# Patient Record
Sex: FEMALE | Race: WHITE | NOT HISPANIC OR LATINO | URBAN - METROPOLITAN AREA
[De-identification: names, ages, dates, MRNs, and addresses within clinical notes are randomized per-mention and may not be internally consistent; named-entity substitution may affect disease eponyms.]

---

## 2017-07-21 ENCOUNTER — INPATIENT (INPATIENT)
Facility: HOSPITAL | Age: 82
LOS: 3 days | Discharge: HOME | DRG: 640 | End: 2017-07-25
Attending: FAMILY MEDICINE | Admitting: FAMILY MEDICINE
Payer: MEDICARE

## 2017-07-21 VITALS
HEART RATE: 48 BPM | SYSTOLIC BLOOD PRESSURE: 118 MMHG | OXYGEN SATURATION: 97 % | TEMPERATURE: 97 F | DIASTOLIC BLOOD PRESSURE: 60 MMHG | WEIGHT: 119.93 LBS | RESPIRATION RATE: 12 BRPM

## 2017-07-21 LAB
ALBUMIN SERPL ELPH-MCNC: 3.4 G/DL — SIGNIFICANT CHANGE UP (ref 3.3–5)
ALP SERPL-CCNC: 135 U/L — HIGH (ref 40–120)
ALT FLD-CCNC: 22 U/L — SIGNIFICANT CHANGE UP (ref 12–78)
ANION GAP SERPL CALC-SCNC: 7 MMOL/L — SIGNIFICANT CHANGE UP (ref 5–17)
APPEARANCE UR: CLEAR — SIGNIFICANT CHANGE UP
AST SERPL-CCNC: 28 U/L — SIGNIFICANT CHANGE UP (ref 15–37)
BILIRUB SERPL-MCNC: 0.6 MG/DL — SIGNIFICANT CHANGE UP (ref 0.2–1.2)
BILIRUB UR-MCNC: NEGATIVE — SIGNIFICANT CHANGE UP
BUN SERPL-MCNC: 33 MG/DL — HIGH (ref 7–23)
CALCIUM SERPL-MCNC: 8.9 MG/DL — SIGNIFICANT CHANGE UP (ref 8.5–10.1)
CHLORIDE SERPL-SCNC: 105 MMOL/L — SIGNIFICANT CHANGE UP (ref 96–108)
CO2 SERPL-SCNC: 31 MMOL/L — SIGNIFICANT CHANGE UP (ref 22–31)
COLOR SPEC: YELLOW — SIGNIFICANT CHANGE UP
CREAT SERPL-MCNC: 1.1 MG/DL — SIGNIFICANT CHANGE UP (ref 0.5–1.3)
DIFF PNL FLD: NEGATIVE — SIGNIFICANT CHANGE UP
GLUCOSE SERPL-MCNC: 130 MG/DL — HIGH (ref 70–99)
GLUCOSE UR QL: NEGATIVE — SIGNIFICANT CHANGE UP
HCT VFR BLD CALC: 41.4 % — SIGNIFICANT CHANGE UP (ref 34.5–45)
HGB BLD-MCNC: 13.4 G/DL — SIGNIFICANT CHANGE UP (ref 11.5–15.5)
INR BLD: 1.11 RATIO — SIGNIFICANT CHANGE UP (ref 0.88–1.16)
KETONES UR-MCNC: NEGATIVE — SIGNIFICANT CHANGE UP
LACTATE SERPL-SCNC: 1.3 MMOL/L — SIGNIFICANT CHANGE UP (ref 0.7–2)
LEUKOCYTE ESTERASE UR-ACNC: NEGATIVE — SIGNIFICANT CHANGE UP
MCHC RBC-ENTMCNC: 27.6 PG — SIGNIFICANT CHANGE UP (ref 27–34)
MCHC RBC-ENTMCNC: 32.3 GM/DL — SIGNIFICANT CHANGE UP (ref 32–36)
MCV RBC AUTO: 85.3 FL — SIGNIFICANT CHANGE UP (ref 80–100)
NITRITE UR-MCNC: NEGATIVE — SIGNIFICANT CHANGE UP
PH UR: 5 — SIGNIFICANT CHANGE UP (ref 5–8)
PLATELET # BLD AUTO: 134 K/UL — LOW (ref 150–400)
POTASSIUM SERPL-MCNC: 3.6 MMOL/L — SIGNIFICANT CHANGE UP (ref 3.5–5.3)
POTASSIUM SERPL-SCNC: 3.6 MMOL/L — SIGNIFICANT CHANGE UP (ref 3.5–5.3)
PROT SERPL-MCNC: 6.6 G/DL — SIGNIFICANT CHANGE UP (ref 6–8.3)
PROT UR-MCNC: NEGATIVE — SIGNIFICANT CHANGE UP
PROTHROM AB SERPL-ACNC: 12.1 SEC — SIGNIFICANT CHANGE UP (ref 9.8–12.7)
RBC # BLD: 4.85 M/UL — SIGNIFICANT CHANGE UP (ref 3.8–5.2)
RBC # FLD: 13.4 % — SIGNIFICANT CHANGE UP (ref 10.3–14.5)
SODIUM SERPL-SCNC: 143 MMOL/L — SIGNIFICANT CHANGE UP (ref 135–145)
SP GR SPEC: 1.02 — SIGNIFICANT CHANGE UP (ref 1.01–1.02)
UROBILINOGEN FLD QL: NEGATIVE — SIGNIFICANT CHANGE UP
WBC # BLD: 9.6 K/UL — SIGNIFICANT CHANGE UP (ref 3.8–10.5)
WBC # FLD AUTO: 9.6 K/UL — SIGNIFICANT CHANGE UP (ref 3.8–10.5)

## 2017-07-21 PROCEDURE — 70450 CT HEAD/BRAIN W/O DYE: CPT | Mod: 26

## 2017-07-21 PROCEDURE — 99285 EMERGENCY DEPT VISIT HI MDM: CPT

## 2017-07-21 PROCEDURE — 71010: CPT | Mod: 26

## 2017-07-21 PROCEDURE — 93010 ELECTROCARDIOGRAM REPORT: CPT

## 2017-07-21 RX ORDER — SODIUM CHLORIDE 9 MG/ML
1000 INJECTION INTRAMUSCULAR; INTRAVENOUS; SUBCUTANEOUS
Qty: 0 | Refills: 0 | Status: COMPLETED | OUTPATIENT
Start: 2017-07-21 | End: 2017-07-21

## 2017-07-21 RX ORDER — PIPERACILLIN AND TAZOBACTAM 4; .5 G/20ML; G/20ML
3.38 INJECTION, POWDER, LYOPHILIZED, FOR SOLUTION INTRAVENOUS ONCE
Qty: 0 | Refills: 0 | Status: COMPLETED | OUTPATIENT
Start: 2017-07-21 | End: 2017-07-22

## 2017-07-21 RX ADMIN — SODIUM CHLORIDE 1000 MILLILITER(S): 9 INJECTION INTRAMUSCULAR; INTRAVENOUS; SUBCUTANEOUS at 23:02

## 2017-07-21 NOTE — ED PROVIDER NOTE - CARE PLAN
Principal Discharge DX:	Altered mental state  Secondary Diagnosis:	Hypothermia  Secondary Diagnosis:	Dehydration

## 2017-07-21 NOTE — ED ADULT NURSE NOTE - ED STAT RN HANDOFF DETAILS
83 y/o f admitted to telemetry for further management and care of ams/dehydration/hypothermia. presently pt lethargic, protecting airway, tmax 95.7 ( rectally- hypothermia blanket in place as ordered), hr 56, bp 110/61, saturating well on ra. endorsed to sofia leal for continuum of care. family at bedside.

## 2017-07-21 NOTE — ED ADULT NURSE NOTE - NS ED PATIENT SAFETY CONCERN
[birth year]        [address]  [address] [place] [address]      Dear Nishant Pearson,    [address] [facility] records indicate that you have outstanding lab work and or testing that was ordered for you and has not yet been completed:          CBC With Differential W No

## 2017-07-21 NOTE — ED ADULT NURSE NOTE - OBJECTIVE STATEMENT
@2240 Received and assumed care of pt , lethargic however protecting airway, tmax 97.5 rectally, hr 55, bp 114/72, saturating well on ra, fingerstick 139mg/dl.. pt is a 83 y/o f with hx dementia presents to ed accompanied by daughter, per daughter pt was sitting at the table and slumped over, no head trauma. seen and evaluated by dr. rodarte. iv established with labs/cultures collected and sent, ivf's infusing, ekg completed, pending ct scan. cardiac and pulse oximetry monitoring in place, will continue to monitor closely @2240 Received and assumed care of pt , lethargic however protecting airway, tmax 95.7 rectally ( per md rodarte will place on behr hugger), hr 55, bp 114/72, saturating well on ra, fingerstick 139mg/dl.. pt is a 83 y/o f with hx dementia presents to ed accompanied by daughter, per daughter pt was sitting at the table and slumped over, no head trauma. seen and evaluated by dr. rodarte. iv established with labs/cultures collected and sent, ivf's infusing, ekg completed, pending ct scan. cardiac and pulse oximetry monitoring in place, will continue to monitor closely

## 2017-07-22 DIAGNOSIS — T68.XXXA HYPOTHERMIA, INITIAL ENCOUNTER: ICD-10-CM

## 2017-07-22 DIAGNOSIS — Z29.9 ENCOUNTER FOR PROPHYLACTIC MEASURES, UNSPECIFIED: ICD-10-CM

## 2017-07-22 DIAGNOSIS — E86.0 DEHYDRATION: ICD-10-CM

## 2017-07-22 DIAGNOSIS — R41.89 OTHER SYMPTOMS AND SIGNS INVOLVING COGNITIVE FUNCTIONS AND AWARENESS: ICD-10-CM

## 2017-07-22 DIAGNOSIS — F03.90 UNSPECIFIED DEMENTIA, UNSPECIFIED SEVERITY, WITHOUT BEHAVIORAL DISTURBANCE, PSYCHOTIC DISTURBANCE, MOOD DISTURBANCE, AND ANXIETY: ICD-10-CM

## 2017-07-22 DIAGNOSIS — R41.82 ALTERED MENTAL STATUS, UNSPECIFIED: ICD-10-CM

## 2017-07-22 DIAGNOSIS — A41.9 SEPSIS, UNSPECIFIED ORGANISM: ICD-10-CM

## 2017-07-22 LAB
BLD GP AB SCN SERPL QL: SIGNIFICANT CHANGE UP
CK SERPL-CCNC: 71 U/L — SIGNIFICANT CHANGE UP (ref 26–192)
FOLATE SERPL-MCNC: 13.7 NG/ML — SIGNIFICANT CHANGE UP (ref 4.8–24.2)
HBA1C BLD-MCNC: 5 % — SIGNIFICANT CHANGE UP (ref 4–5.6)
T3 SERPL-MCNC: 85 NG/DL — SIGNIFICANT CHANGE UP (ref 80–200)
T4 AB SER-ACNC: 7.7 UG/DL — SIGNIFICANT CHANGE UP (ref 4.6–12)
TROPONIN I SERPL-MCNC: <.015 NG/ML — SIGNIFICANT CHANGE UP (ref 0.01–0.04)
VIT B12 SERPL-MCNC: 569 PG/ML — SIGNIFICANT CHANGE UP (ref 243–894)

## 2017-07-22 RX ORDER — HEPARIN SODIUM 5000 [USP'U]/ML
5000 INJECTION INTRAVENOUS; SUBCUTANEOUS EVERY 12 HOURS
Qty: 0 | Refills: 0 | Status: DISCONTINUED | OUTPATIENT
Start: 2017-07-21 | End: 2017-07-22

## 2017-07-22 RX ORDER — SODIUM CHLORIDE 9 MG/ML
1000 INJECTION INTRAMUSCULAR; INTRAVENOUS; SUBCUTANEOUS
Qty: 0 | Refills: 0 | Status: DISCONTINUED | OUTPATIENT
Start: 2017-07-22 | End: 2017-07-24

## 2017-07-22 RX ORDER — HEPARIN SODIUM 5000 [USP'U]/ML
5000 INJECTION INTRAVENOUS; SUBCUTANEOUS EVERY 8 HOURS
Qty: 0 | Refills: 0 | Status: DISCONTINUED | OUTPATIENT
Start: 2017-07-22 | End: 2017-07-24

## 2017-07-22 RX ADMIN — SODIUM CHLORIDE 75 MILLILITER(S): 9 INJECTION INTRAMUSCULAR; INTRAVENOUS; SUBCUTANEOUS at 18:50

## 2017-07-22 RX ADMIN — HEPARIN SODIUM 5000 UNIT(S): 5000 INJECTION INTRAVENOUS; SUBCUTANEOUS at 16:15

## 2017-07-22 RX ADMIN — PIPERACILLIN AND TAZOBACTAM 200 GRAM(S): 4; .5 INJECTION, POWDER, LYOPHILIZED, FOR SOLUTION INTRAVENOUS at 00:12

## 2017-07-22 RX ADMIN — HEPARIN SODIUM 5000 UNIT(S): 5000 INJECTION INTRAVENOUS; SUBCUTANEOUS at 05:09

## 2017-07-22 RX ADMIN — HEPARIN SODIUM 5000 UNIT(S): 5000 INJECTION INTRAVENOUS; SUBCUTANEOUS at 22:42

## 2017-07-22 NOTE — DIETITIAN INITIAL EVALUATION ADULT. - OTHER INFO
Per MAGAN Fernandes, pt eating poorly despite encouragement, head CT- negative, admitted due to unresponsiveness and facial droop. PMHX as below. Pt on bed laying in fetal position, appears thin. MVI is appropriate in view of pressure injury. Pt might benefit from a downgrade in diet consistency to mech soft or puree and Ensure Enlive supplementation

## 2017-07-22 NOTE — H&P ADULT - HISTORY OF PRESENT ILLNESS
This is an 82y Female complaining of unresponsive.  81 y/o w/f h/o advanced dementia, she developed acute mental change at 9am. She was assisted by her grandson and walked to the bathroom and afterwards she had a meal. At 9pm she was found unresponsive and facial droop. In the ER she is not verbal, moves all extremities and resisting the staff with good upper extremity strength. This is an 81 YO W Female brought to ER because patient found unresponsive by her grand son.  According to the Grand son patient with advanced dementia, She developed acute confusion around 9 am. She was assisted by her grandson and walked to the bathroom and afterwards she had a meal. At around 9 pm she was found unresponsive and facial droop. In the ER she is not verbal, moves all extremities and resisting the staff with good upper extremity strength. No nausea or vomiting.

## 2017-07-22 NOTE — H&P ADULT - NSHPLABSRESULTS_GEN_ALL_CORE
13.4   9.6   )-----------( 134      ( 2017 22:54 )             41.4     2017 22:54    143    |  105    |  33     ----------------------------<  130    3.6     |  31     |  1.10     Ca    8.9        2017 22:54    TPro  6.6    /  Alb  3.4    /  TBili  0.6    /  DBili  x      /  AST  28     /  ALT  22     /  AlkPhos  135    2017 22:54    LIVER FUNCTIONS - ( 2017 22:54 )  Alb: 3.4 g/dL / Pro: 6.6 g/dL / ALK PHOS: 135 U/L / ALT: 22 U/L / AST: 28 U/L / GGT: x           PT/INR - ( 2017 22:54 )   PT: 12.1 sec;   INR: 1.11 ratio           CAPILLARY BLOOD GLUCOSE  139 (2017 22:15)        CARDIAC MARKERS ( 2017 00:19 )  <.015 ng/mL / x     / 71 U/L / x     / x          Urinalysis Basic - ( 2017 22:57 )    Color: Yellow / Appearance: Clear / S.020 / pH: x  Gluc: x / Ketone: Negative  / Bili: Negative / Urobili: Negative   Blood: x / Protein: Negative / Nitrite: Negative   Leuk Esterase: Negative / RBC: x / WBC x   Sq Epi: x / Non Sq Epi: x / Bacteria: x

## 2017-07-22 NOTE — ED ADULT NURSE REASSESSMENT NOTE - NS ED NURSE REASSESS COMMENT FT1
patient admitted to telemetry services for further management and care of AMS, hypothermia & dehydration. awaiting bed assignment, will continue to monitor. pt in no apparent distress.

## 2017-07-22 NOTE — PATIENT PROFILE ADULT. - VISION (WITH CORRECTIVE LENSES IF THE PATIENT USUALLY WEARS THEM):
nonresponsive/Normal vision: sees adequately in most situations; can see medication labels, newsprint

## 2017-07-23 DIAGNOSIS — G30.8 OTHER ALZHEIMER'S DISEASE: ICD-10-CM

## 2017-07-23 LAB
CULTURE RESULTS: NO GROWTH — SIGNIFICANT CHANGE UP
SPECIMEN SOURCE: SIGNIFICANT CHANGE UP

## 2017-07-23 RX ADMIN — SODIUM CHLORIDE 75 MILLILITER(S): 9 INJECTION INTRAMUSCULAR; INTRAVENOUS; SUBCUTANEOUS at 05:30

## 2017-07-23 RX ADMIN — Medication 1 TABLET(S): at 11:19

## 2017-07-23 RX ADMIN — HEPARIN SODIUM 5000 UNIT(S): 5000 INJECTION INTRAVENOUS; SUBCUTANEOUS at 21:57

## 2017-07-23 RX ADMIN — HEPARIN SODIUM 5000 UNIT(S): 5000 INJECTION INTRAVENOUS; SUBCUTANEOUS at 13:08

## 2017-07-23 RX ADMIN — HEPARIN SODIUM 5000 UNIT(S): 5000 INJECTION INTRAVENOUS; SUBCUTANEOUS at 05:30

## 2017-07-23 NOTE — CONSULT NOTE ADULT - SUBJECTIVE AND OBJECTIVE BOX
HPI:  This is an 83 YO W Female brought to ER because patient found unresponsive by her grand son.  According to the Grand son patient with advanced dementia, She developed acute confusion around 9 am. She was assisted by her grandson and walked to the bathroom and afterwards she had a meal. At around 9 pm she was found unresponsive and facial droop. In the ER she is not verbal, moves all extremities and resisting the staff with good upper extremity strength. No nausea or vomiting. (2017 05:14)    Patient not able to add to history    PAST MEDICAL & SURGICAL HISTORY:  Dementia      Antimicrobials  NONE after 1-dose Zosyn in ER    Immunological      Other  heparin  Injectable 5000 Unit(s) SubCutaneous every 8 hours  multivitamin 1 Tablet(s) Oral daily  sodium chloride 0.9%. 1000 milliLiter(s) IV Continuous <Continuous>      Allergies    No Known Allergies    Intolerances        SOCIAL HISTORY:no tobacco    FAMILY HISTORY:noncontrib      ROS:    EYES:  Negative  blurry vision or double vision  GASTROINTESTINAL:  Negative for nausea, vomiting, diarrhea  -otherwise negative except for subjective    Vital Signs Last 24 Hrs  T(C): 36.3 (2017 08:41), Max: 36.5 (2017 11:30)  T(F): 97.4 (2017 08:41), Max: 97.7 (2017 11:30)  HR: 60 (2017 08:41) (49 - 80)  BP: 125/82 (2017 08:41) (100/39 - 132/73)  BP(mean): --  RR: 14 (2017 08:41) (14 - 15)  SpO2: 98% (2017 08:41) (94% - 99%)    PE:  demented female drooling, keeping eyes close  HEENT:  NC,  Sinuses nontender, no nasal exudate.  No buccal or pharyngeal lesions, erythema or exudate  Neck:  Supple, no adenopathy  Lungs:  No accessory muscle use, bilaterally clear to auscultation  Cor:  RRR, S1, S2, no murmur appreciated  Abd:  Symmetric, normoactive BS.  Soft, nontender, no masses, guarding or rebound.  Liver and spleen not enlarged  Extrem:  No cyanosis or edema  Skin:  No rashes.  Neuro: nonverbal  Musc: pt not cooperating with exam    LABS:                        13.4   9.6   )-----------( 134      ( 2017 22:54 )             41.4         143  |  105  |  33<H>  ----------------------------<  130<H>  3.6   |  31  |  1.10    Ca    8.9      2017 22:54    TPro  6.6  /  Alb  3.4  /  TBili  0.6  /  DBili  x   /  AST  28  /  ALT  22  /  AlkPhos  135<H>      Urinalysis Basic - ( 2017 22:57 )    Color: Yellow / Appearance: Clear / S.020 / pH: x  Gluc: x / Ketone: Negative  / Bili: Negative / Urobili: Negative   Blood: x / Protein: Negative / Nitrite: Negative   Leuk Esterase: Negative / RBC: x / WBC x   Sq Epi: x / Non Sq Epi: x / Bacteria: x        MICROBIOLOGY:      RADIOLOGY & ADDITIONAL STUDIES:    --< from: Xray Chest 1 View AP-PORTABLE IMMEDIATE (17 @ 22:57) >  EXAM:  CHEST PORTABLE IMMED                            PROCEDURE DATE:  2017          INTERPRETATION:  Altered mental status.    AP chest. Prior 2014.    Rotated. Chin obscures right apex. Normal heart size. Unfolding   calcination thoracic aorta. No consolidation or effusion. Old fracture   left sixth rib.    Impression: Limited. No acute pleural-parenchymal process.    < from: CT Head No Cont (17 @ 23:07) >    EXAM:  CT BRAIN                            *** ADDENDUM 2017  ***    Addendum: Calcified extra-axial lesion along the right aspect of the   anterior interhemispheric falx, which is stable since 2016 and   likely represents a meningioma.      PROCEDURE DATE:  2017          INTERPRETATION:  Clinical indication: Altered mental status.    Technique: CT axial images of the head   Impression:     No obvious acute intracranial hemorrhage, extra axial fluid collection or   displaced skull fracture, given the extent of artifact. If clinically   indicated and when patient is able to cooperate, a short-term follow-up   or an MRI may be obtained for further evaluation.

## 2017-07-23 NOTE — CONSULT NOTE ADULT - PROBLEM SELECTOR RECOMMENDATION 3
understand concerns in this context but no fever, no suggested localization or infectious process. Recommend continued observation without antibiotics.    Thank you for consulting us and involving us in the management of this most interesting and challenging case.     We will follow along in the care of this patient.

## 2017-07-24 LAB
ANION GAP SERPL CALC-SCNC: 2 MMOL/L — LOW (ref 5–17)
BUN SERPL-MCNC: 20 MG/DL — SIGNIFICANT CHANGE UP (ref 7–23)
CALCIUM SERPL-MCNC: 8.7 MG/DL — SIGNIFICANT CHANGE UP (ref 8.5–10.1)
CHLORIDE SERPL-SCNC: 112 MMOL/L — HIGH (ref 96–108)
CO2 SERPL-SCNC: 33 MMOL/L — HIGH (ref 22–31)
CREAT SERPL-MCNC: 1.1 MG/DL — SIGNIFICANT CHANGE UP (ref 0.5–1.3)
GLUCOSE SERPL-MCNC: 80 MG/DL — SIGNIFICANT CHANGE UP (ref 70–99)
HCT VFR BLD CALC: 36.1 % — SIGNIFICANT CHANGE UP (ref 34.5–45)
HGB BLD-MCNC: 11.6 G/DL — SIGNIFICANT CHANGE UP (ref 11.5–15.5)
MAGNESIUM SERPL-MCNC: 1.9 MG/DL — SIGNIFICANT CHANGE UP (ref 1.6–2.6)
MCHC RBC-ENTMCNC: 27.4 PG — SIGNIFICANT CHANGE UP (ref 27–34)
MCHC RBC-ENTMCNC: 32 GM/DL — SIGNIFICANT CHANGE UP (ref 32–36)
MCV RBC AUTO: 85.8 FL — SIGNIFICANT CHANGE UP (ref 80–100)
PLATELET # BLD AUTO: 106 K/UL — LOW (ref 150–400)
POTASSIUM SERPL-MCNC: 4.6 MMOL/L — SIGNIFICANT CHANGE UP (ref 3.5–5.3)
POTASSIUM SERPL-SCNC: 4.6 MMOL/L — SIGNIFICANT CHANGE UP (ref 3.5–5.3)
RBC # BLD: 4.21 M/UL — SIGNIFICANT CHANGE UP (ref 3.8–5.2)
RBC # FLD: 13.2 % — SIGNIFICANT CHANGE UP (ref 10.3–14.5)
SODIUM SERPL-SCNC: 147 MMOL/L — HIGH (ref 135–145)
WBC # BLD: 7.6 K/UL — SIGNIFICANT CHANGE UP (ref 3.8–10.5)
WBC # FLD AUTO: 7.6 K/UL — SIGNIFICANT CHANGE UP (ref 3.8–10.5)

## 2017-07-24 RX ORDER — FAMOTIDINE 10 MG/ML
20 INJECTION INTRAVENOUS DAILY
Qty: 0 | Refills: 0 | Status: DISCONTINUED | OUTPATIENT
Start: 2017-07-24 | End: 2017-07-25

## 2017-07-24 RX ORDER — HALOPERIDOL DECANOATE 100 MG/ML
0.5 INJECTION INTRAMUSCULAR ONCE
Qty: 0 | Refills: 0 | Status: COMPLETED | OUTPATIENT
Start: 2017-07-24 | End: 2017-07-24

## 2017-07-24 RX ORDER — ASPIRIN/CALCIUM CARB/MAGNESIUM 324 MG
81 TABLET ORAL DAILY
Qty: 0 | Refills: 0 | Status: DISCONTINUED | OUTPATIENT
Start: 2017-07-24 | End: 2017-07-25

## 2017-07-24 RX ORDER — HEPARIN SODIUM 5000 [USP'U]/ML
5000 INJECTION INTRAVENOUS; SUBCUTANEOUS EVERY 12 HOURS
Qty: 0 | Refills: 0 | Status: DISCONTINUED | OUTPATIENT
Start: 2017-07-24 | End: 2017-07-25

## 2017-07-24 RX ORDER — ASPIRIN/CALCIUM CARB/MAGNESIUM 324 MG
81 TABLET ORAL DAILY
Qty: 0 | Refills: 0 | Status: DISCONTINUED | OUTPATIENT
Start: 2017-07-24 | End: 2017-07-24

## 2017-07-24 RX ADMIN — HEPARIN SODIUM 5000 UNIT(S): 5000 INJECTION INTRAVENOUS; SUBCUTANEOUS at 12:31

## 2017-07-24 RX ADMIN — Medication 1 TABLET(S): at 11:25

## 2017-07-24 RX ADMIN — SODIUM CHLORIDE 75 MILLILITER(S): 9 INJECTION INTRAMUSCULAR; INTRAVENOUS; SUBCUTANEOUS at 05:36

## 2017-07-24 RX ADMIN — HEPARIN SODIUM 5000 UNIT(S): 5000 INJECTION INTRAVENOUS; SUBCUTANEOUS at 21:23

## 2017-07-24 RX ADMIN — HALOPERIDOL DECANOATE 0.5 MILLIGRAM(S): 100 INJECTION INTRAMUSCULAR at 12:31

## 2017-07-24 RX ADMIN — HEPARIN SODIUM 5000 UNIT(S): 5000 INJECTION INTRAVENOUS; SUBCUTANEOUS at 05:37

## 2017-07-24 NOTE — PROGRESS NOTE ADULT - SUBJECTIVE AND OBJECTIVE BOX
Neurology Follow up note    DEVAN GASCA82yFemale    HPI:  This is an 83 YO W Female brought to ER because patient found unresponsive by her grand son.  According to the Grand son patient with advanced dementia, She developed acute confusion around 9 am. She was assisted by her grandson and walked to the bathroom and afterwards she had a meal. At around 9 pm she was found unresponsive and facial droop. In the ER she is not verbal, moves all extremities and resisting the staff with good upper extremity strength. No nausea or vomiting. (2017 05:14)      Interval History - no new events.    Patient is seen, chart was reviewed and case was discussed with the treatment team.  Pt is not in any distress.   Lying on bed comfortably.   No events reported overnight.   No clinical seizure was reported.  Sitting on chair bed comfortably.    is at bedside.    Vital Signs Last 24 Hrs  T(C): 36.1 (2017 15:16), Max: 36.4 (2017 12:00)  T(F): 97 (2017 15:16), Max: 97.6 (2017 12:00)  HR: 63 (2017 15:16) (50 - 72)  BP: 138/70 (2017 15:16) (100/39 - 138/70)  BP(mean): --  RR: 15 (2017 15:16) (14 - 15)  SpO2: 100% (2017 15:16) (95% - 100%)        REVIEW OF SYSTEMS:    unobtainable due to dementia.    On Neurological Examination:    Mental Status - Pt is drowsy today.    Speech -  non verbal.    Cranial Nerves - Pupils 2 mm equal and reactive to light,.  Pt has facial asymmetry.    Muscle tone - atrophy.    Motor Exam - 2-3/5.    Sensory Exam - Pt withdraws all extremities equally on stimulation. No asymmetry seen. No complaints of tingling, numbness.      Deep tendon Reflexes - 2 plus all over.        Romberg - Negative.    Neck Supple -  Yes.     MEDICATIONS    heparin  Injectable 5000 Unit(s) SubCutaneous every 8 hours  multivitamin 1 Tablet(s) Oral daily  sodium chloride 0.9%. 1000 milliLiter(s) IV Continuous <Continuous>      Allergies    No Known Allergies    Intolerances        LABS:  CBC Full  -  ( 2017 22:54 )  WBC Count : 9.6 K/uL  Hemoglobin : 13.4 g/dL  Hematocrit : 41.4 %  Platelet Count - Automated : 134 K/uL  Mean Cell Volume : 85.3 fl  Mean Cell Hemoglobin : 27.6 pg  Mean Cell Hemoglobin Concentration : 32.3 gm/dL  Auto Neutrophil # : x  Auto Lymphocyte # : x  Auto Monocyte # : x  Auto Eosinophil # : x  Auto Basophil # : x  Auto Neutrophil % : x  Auto Lymphocyte % : x  Auto Monocyte % : x  Auto Eosinophil % : x  Auto Basophil % : x    Urinalysis Basic - ( 2017 22:57 )    Color: Yellow / Appearance: Clear / S.020 / pH: x  Gluc: x / Ketone: Negative  / Bili: Negative / Urobili: Negative   Blood: x / Protein: Negative / Nitrite: Negative   Leuk Esterase: Negative / RBC: x / WBC x   Sq Epi: x / Non Sq Epi: x / Bacteria: x          143  |  105  |  33<H>  ----------------------------<  130<H>  3.6   |  31  |  1.10    Ca    8.9      2017 22:54    TPro  6.6  /  Alb  3.4  /  TBili  0.6  /  DBili  x   /  AST  28  /  ALT  22  /  AlkPhos  135<H>      Hemoglobin A1C:     Vitamin B12     RADIOLOGY  < from: CT Head No Cont (17 @ 23:07) >    EXAM:  CT BRAIN                            *** ADDENDUM 2017  ***    Addendum: Calcified extra-axial lesion along the right aspect of the   anterior interhemispheric falx, which is stable since 2016 and   likely represents a meningioma.      *** END OF ADDENDUM 2017  ***      PROCEDURE DATE:  2017          INTERPRETATION:  Clinical indication: Altered mental status.    Technique: CT axial images of the head were obtained without intravenous   contrast. Computer-reconstructed coronal and sagittal images were   obtained.    Comparison:  2016    Findings: The patient motion slightly degrades images. There is no acute   intracranial hemorrhage, extra-axial fluid collection, large cortical   infarct, mass effect or midline shift, given the extent of artifact.   Nonspecific mild periventricular and subcortical white matter lucencies   likely represent chronic microvascular ischemic changes. There is   moderate cerebral volume loss with commensurate ventricular dilatation.     There is no depressed skull fracture. Visualized paranasal sinuses and   tympanomastoid region are unremarkable. Previously noted right frontal   scalp soft tissue hematoma has resolved.     Impression:     No obvious acute intracranial hemorrhage, extra axial fluid collection or   displaced skull fracture, given the extent of artifact. If clinically   indicated and when patient is able to cooperate, a short-term follow-up   or an MRI may be obtained for further evaluation.        ***Please see the addendum at the top of this report. It may contain   additional important information or changes.****          VANITA CYR M.D., ATTENDING RADIOLOGIST  This document has been electronically signed. 2017 11:17PM  Addend:  JOCELYN QUEZADA, ATTENDING RADIOLOGIST  This addendum was electronically signed on: 2017  3:16AM.          < end of copied text >    ASSESSMENT AND PLAN:      syncope.  advanced dementia.    Physical therapy evaluation.  OOB to chair/ambulation with assistance only..  Plan of care was discussed with family. Questions answered.  Would continue to follow.
[facility] ANGELA.CMikel [place]       HPI:  This is an 83 YO W Female brought to ER because patient found unresponsive by her grand son.  According to the Grand son patient with advanced dementia, She developed acute confusion around 9 am. She was assisted by her grandson and walked to the bathroom and afterwards she had a meal. At around 9 pm she was found unresponsive and facial droop. In the ER she is not verbal, moves all extremities and resisting the staff with good upper extremity strength. No nausea or vomiting. (22 Jul 2017 05:14)        SUBJECTIVE: Patient lying in bed , very confused but no distress and chest pain and SOB .      ALLERGIES:  Allergies    No Known Allergies    Intolerances          MEDICATIONS  (STANDING):  heparin  Injectable 5000 Unit(s) SubCutaneous every 8 hours  multivitamin 1 Tablet(s) Oral daily    MEDICATIONS  (PRN):      REVIEW OF SYSTEMS:  CONSTITUTIONAL: No fever,  RESPIRATORY: No cough, wheezing, shortness of breath  CARDIOVASCULAR: No chest pain, dyspnea, palpitations, dizziness, syncope, paroxysmal nocturnal dyspnea, orthopnea, or arm or leg swelling  GASTROINTESTINAL: No abdominal  or epigastric pain, nausea, vomiting,  diarrhea  NEUROLOGICAL: No headaches,  loss of strength, numbness, or tremors    Vital Signs Last 24 Hrs  T(C): 36 (24 Jul 2017 20:32), Max: 36.6 (24 Jul 2017 07:32)  T(F): 96.8 (24 Jul 2017 20:32), Max: 97.9 (24 Jul 2017 07:32)  HR: 88 (24 Jul 2017 20:32) (50 - 88)  BP: 122/78 (24 Jul 2017 20:30) (115/54 - 164/76)  BP(mean): --  RR: 17 (24 Jul 2017 16:22) (15 - 17)  SpO2: 92% (24 Jul 2017 16:22) (92% - 100%)    PHYSICAL EXAM:  HEAD:  Atraumatic, Normocephalic  NECK: Supple and normal thyroid.  No JVD or carotid bruit.   HEART: S1, S2 regular , 1/6 soft ejection systolic murmur in mitral area , no thrill and no gallops .  PULMONARY: Bilateral vesicular breathing , few scattered rhonchi and few scattered rales are present .  ABDOMEN: Soft nontender and positive bowl sounds   EXTREMITIES:  No clubbing, cyanosis, or pedal  edema  NEUROLOGICAL: AA but very confused .    LABS:                        11.6   7.6   )-----------( 106      ( 24 Jul 2017 05:21 )             36.1     07-24    147<H>  |  112<H>  |  20  ----------------------------<  80  4.6   |  33<H>  |  1.10    Ca    8.7      24 Jul 2017 05:21  Mg     1.9     07-24                  Assessment/Plan  Patient has :  1) S/P unresponsive episode and facial droop .  2) Severe dementia .  3) S/P Bradycardia episode , possible secondary to vasovagal .  4) Mild Hypertension and stable .  Plan : 1) Cardiac stable so far . 2) Echo pending . 3) Continue present medications . 4) Stable for rehab .  Will continue to follow during hospital course and give further recommendations as needed . Thanks for your referral . if any questions please contact me at office ([phone] [phone])
Patient is a 82y old  Female who presents with a chief complaint of Unresponsive and hypothermic (2017 05:14)      INTERVAL /OVERNIGHT EVENTS: still lethargic    MEDICATIONS  (STANDING):  heparin  Injectable 5000 Unit(s) Sub Cutaneous every 8 hours  multivitamin 1 Tablet(s) Oral daily    MEDICATIONS  (PRN):      Allergies    No Known Allergies    Intolerances        REVIEW OF SYSTEMS:  CONSTITUTIONAL: No fever, weight loss, or fatigue  EYES: No eye pain, visual disturbances, or discharge  ENMT:  No difficulty hearing, tinnitus, vertigo; No sinus or throat pain  NECK: No pain or stiffness  RESPIRATORY: No cough, wheezing, chills or hemoptysis; No shortness of breath  CARDIOVASCULAR: No chest pain, palpitations, dizziness, or leg swelling  GASTROINTESTINAL: No abdominal or epigastric pain. No nausea, vomiting, or hematemesis; No diarrhea or constipation. No melena or hematochezia.  GENITOURINARY: No dysuria, frequency, hematuria, or incontinence  NEUROLOGICAL: No headaches, memory loss, loss of strength, numbness, or tremors  SKIN: No itching, burning, rashes, or lesions   LYMPH NODES: No enlarged glands  ENDOCRINE: No heat or cold intolerance; No hair loss; No polydipsia or polyuria  MUSCULOSKELETAL: No joint pain or swelling; No muscle, back, or extremity pain  PSYCHIATRIC: No depression, anxiety, mood swings, or difficulty sleeping  HEME/LYMPH: No easy bruising, or bleeding gums  ALLERGY AND IMMUNOLOGIC: No hives or eczema    Vital Signs Last 24 Hrs  T(C): 36.5 (2017 11:30), Max: 36.6 (2017 08:00)  T(F): 97.7 (2017 11:30), Max: 97.9 (2017 08:00)  HR: 80 (2017 11:30) (48 - 80)  BP: 123/73 (2017 11:30) (104/66 - 129/70)  BP(mean): --  RR: 14 (2017 11:30) (12 - 15)  SpO2: 94% (2017 11:30) (91% - 99%)    PHYSICAL EXAM:  GENERAL: NAD, well-groomed, well-developed  HEAD:  Atraumatic, Normocephalic  EYES: EOMI, PERRLA, conjunctiva and sclera clear  ENMT: No tonsillar erythema, exudates, or enlargement; Moist mucous membranes, Good dentition, No lesions  NECK: Supple, No JVD, Normal thyroid  NERVOUS SYSTEM:  Alert & Oriented X3, Good concentration; Motor Strength 5/5 B/L upper and lower extremities; DTRs 2+ intact and symmetric  CHEST/LUNG: Clear to auscultation bilaterally; No rales, rhonchi, wheezing, or rubs  HEART: Regular rate and rhythm; No murmurs, rubs, or gallops  ABDOMEN: Soft, Nontender, Nondistended; Bowel sounds present  EXTREMITIES:  2+ Peripheral Pulses, No clubbing, cyanosis, or edema  LYMPH: No lymphadenopathy noted  SKIN: No rashes or lesions    LABS:                        13.4   9.6   )-----------( 134      ( 2017 22:54 )             41.4     2017 22:54    143    |  105    |  33     ----------------------------<  130    3.6     |  31     |  1.10     Ca    8.9        2017 22:54    TPro  6.6    /  Alb  3.4    /  TBili  0.6    /  DBili  x      /  AST  28     /  ALT  22     /  AlkPhos  135    2017 22:54    PT/INR - ( 2017 22:54 )   PT: 12.1 sec;   INR: 1.11 ratio           Urinalysis Basic - ( 2017 22:57 )    Color: Yellow / Appearance: Clear / S.020 / pH: x  Gluc: x / Ketone: Negative  / Bili: Negative / Urobili: Negative   Blood: x / Protein: Negative / Nitrite: Negative   Leuk Esterase: Negative / RBC: x / WBC x   Sq Epi: x / Non Sq Epi: x / Bacteria: x      CAPILLARY BLOOD GLUCOSE  139 (2017 22:15)          RADIOLOGY & ADDITIONAL TESTS: < from: CT Head No Cont (17 @ 23:07) >  EXAM:  CT BRAIN                            *** ADDENDUM 2017  ***    Addendum: Calcified extra-axial lesion along the right aspect of the   anterior interhemispheric falx, which is stable since 2016 and   likely represents a meningioma.      *** END OF ADDENDUM 2017  ***      PROCEDURE DATE:  2017          INTERPRETATION:  Clinical indication: Altered mental status.    Technique: CT axial images of the head were obtained without intravenous   contrast. Computer-reconstructed coronal and sagittal images were   obtained.    Comparison:  2016    Findings: The patient motion slightly degrades images. There is no acute   intracranial hemorrhage, extra-axial fluid collection, large cortical   infarct, mass effect or midline shift, given the extent of artifact.   Nonspecific mild periventricular and subcortical white matter lucencies   likely represent chronic microvascular ischemic changes. There is   moderate cerebral volume loss with commensurate ventricular dilatation.     There is no depressed skull fracture. Visualized paranasal sinuses and   tympanomastoid region are unremarkable. Previously noted right frontal   scalp soft tissue hematoma has resolved.     Impression:     No obvious acute intracranial hemorrhage, extra axial fluid collection or   displaced skull fracture, given the extent of artifact. If clinically   indicated and when patient is able to cooperate, a short-term follow-up   or an MRI may be obtained for further evaluation.        ***Please see the addendum at the top of this report. It may contain   additional important information or changes.****            < end of copied text >      Notes Reviewed:  [x ] YES  [ ] NO    Care Discussed with Consultants/Other Providers [ x] YES  [ ] NO
Patient is a 82y old  Female who presents with a chief complaint of Unresponsive and hypothermic (22 Jul 2017 05:14)      INTERVAL HPI/OVERNIGHT EVENTS:stable, daughter at bedside, for dc home with home care, pt eval noted    MEDICATIONS  (STANDING):  heparin  Injectable 5000 Unit(s) SubCutaneous every 8 hours  multivitamin 1 Tablet(s) Oral daily    MEDICATIONS  (PRN):      Allergies    No Known Allergies    Intolerances        REVIEW OF SYSTEMS:  CONSTITUTIONAL: No fever, weight loss, or fatigue  EYES: No eye pain, visual disturbances  ENMT:  No difficulty hearing, tinnitus, vertigo; No sinus or throat pain  NECK: No pain or stiffness  RESPIRATORY: No cough, wheezing, chills or hemoptysis; No shortness of breath  CARDIOVASCULAR: No chest pain, palpitations, dizziness  GASTROINTESTINAL: No abdominal or epigastric pain. No nausea, vomiting, or hematemesis; No diarrhea or constipation. No melena or hematochezia.  GENITOURINARY: No dysuria, frequency, hematuria, or incontinence  NEUROLOGICAL: lethargic and confused  SKIN: No itching, burning  LYMPH NODES: No enlarged glands  MUSCULOSKELETAL: No joint pain or swelling; No muscle, back, or extremity pain  PSYCHIATRIC: No depression, mood swings  HEME/LYMPH: No easy bruising, or bleeding gums  ALLERGY AND IMMUNOLOGIC: No hives    Vital Signs Last 24 Hrs  T(C): 36.6 (24 Jul 2017 07:32), Max: 36.6 (24 Jul 2017 07:32)  T(F): 97.9 (24 Jul 2017 07:32), Max: 97.9 (24 Jul 2017 07:32)  HR: 54 (24 Jul 2017 07:32) (50 - 79)  BP: 115/54 (24 Jul 2017 07:32) (115/54 - 150/79)  BP(mean): --  RR: 16 (24 Jul 2017 07:32) (14 - 16)  SpO2: 98% (24 Jul 2017 07:32) (95% - 100%)    PHYSICAL EXAM:  GENERAL: NAD, well-groomed, well-developed  HEAD:  Atraumatic, Normocephalic  EYES: EOMI, PERRLA, conjunctiva and sclera clear  ENMT: No tonsillar erythema, exudates, or enlargement   NECK: Supple, No JVD  NERVOUS SYSTEM:  Alert & Oriented, but confused and lethargic  CHEST/LUNG: Clear to auscultation bilaterally; No rales, rhonchi, wheezing  HEART: Regular rate and rhythm  ABDOMEN: Soft, Nontender, Nondistended; Bowel sounds present  EXTREMITIES:  2+ Peripheral Pulses   LYMPH: No lymphadenopathy noted  SKIN: No rashes     LABS:                        11.6   7.6   )-----------( 106      ( 24 Jul 2017 05:21 )             36.1     24 Jul 2017 05:21    147    |  112    |  20     ----------------------------<  80     4.6     |  33     |  1.10     Ca    8.7        24 Jul 2017 05:21  Mg     1.9       24 Jul 2017 05:21          CAPILLARY BLOOD GLUCOSE                RADIOLOGY & ADDITIONAL TESTS:    no new    Consultant(s) Notes Reviewed:  [ ] YES  [x ] NO    Care Discussed with Consultants/Other Providers [ ] YES  [ ] NO    Advanced Care Planning Discussed with Patien/Family [ x] YES  [ ] NO, molst form reviewed and signed, discussed with daughter
Progress Note:   · Provider Specialty	Neurology	      · Subjective and Objective: 	  Neurology Follow up note    DEVAN GASCA82yFemale    HPI:  This is an 81 YO W Female brought to ER because patient found unresponsive by her grand son.  According to the Grand son patient with advanced dementia, She developed acute confusion around 9 am. She was assisted by her grandson and walked to the bathroom and afterwards she had a meal. At around 9 pm she was found unresponsive and facial droop. In the ER she is not verbal, moves all extremities and resisting the staff with good upper extremity strength. No nausea or vomiting. (2017 05:14)      Interval History - able to ambulate   with walker and assistance.    Patient is seen, chart was reviewed and case was discussed with the treatment team.  Pt is not in any distress.   Lying on bed comfortably.   No events reported overnight.   No clinical seizure was reported.  Sitting on chair bed comfortably.    is at bedside.    Vital Signs Last 24 Hrs  T(C): 36.6 (2017 07:32), Max: 36.6 (2017 07:32)  T(F): 97.9 (2017 07:32), Max: 97.9 (2017 07:32)  HR: 54 (2017 07:32) (50 - 79)  BP: 115/54 (2017 07:32) (115/54 - 150/79)  BP(mean): --  RR: 16 (2017 07:32) (15 - 16)  SpO2: 98% (2017 07:32) (95% - 100%)    REVIEW OF SYSTEMS:    unobtainable due to dementia.    On Neurological Examination:    Mental Status - Pt is awake but confused..    Speech -  non verbal.    Cranial Nerves - Pupils 2 mm equal and reactive to light,.  Pt has facial asymmetry.    Muscle tone - atrophy.    Motor Exam - 3-4/5.    Sensory Exam - Pt withdraws all extremities equally on stimulation. No asymmetry seen. No complaints of tingling, numbness.      Deep tendon Reflexes - 2 plus all over.        Romberg - Negative.    Neck Supple -  Yes.     MEDICATIONS    heparin  Injectable 5000 Unit(s) SubCutaneous every 8 hours  multivitamin 1 Tablet(s) Oral daily  sodium chloride 0.9%. 1000 milliLiter(s) IV Continuous <Continuous>      Allergies    No Known Allergies    Intolerances        LABS:  CBC Full  -  ( 2017 22:54 )  WBC Count : 9.6 K/uL  Hemoglobin : 13.4 g/dL  Hematocrit : 41.4 %  Platelet Count - Automated : 134 K/uL  Mean Cell Volume : 85.3 fl  Mean Cell Hemoglobin : 27.6 pg  Mean Cell Hemoglobin Concentration : 32.3 gm/dL  Auto Neutrophil # : x  Auto Lymphocyte # : x  Auto Monocyte # : x  Auto Eosinophil # : x  Auto Basophil # : x  Auto Neutrophil % : x  Auto Lymphocyte % : x  Auto Monocyte % : x  Auto Eosinophil % : x  Auto Basophil % : x    Urinalysis Basic - ( 2017 22:57 )    Color: Yellow / Appearance: Clear / S.020 / pH: x  Gluc: x / Ketone: Negative  / Bili: Negative / Urobili: Negative   Blood: x / Protein: Negative / Nitrite: Negative   Leuk Esterase: Negative / RBC: x / WBC x   Sq Epi: x / Non Sq Epi: x / Bacteria: x          143  |  105  |  33<H>  ----------------------------<  130<H>  3.6   |  31  |  1.10    Ca    8.9      2017 22:54    TPro  6.6  /  Alb  3.4  /  TBili  0.6  /  DBili  x   /  AST  28  /  ALT  22  /  AlkPhos  135<H>      Hemoglobin A1C:     Vitamin B12     RADIOLOGY  < from: CT Head No Cont (17 @ 23:07) >    EXAM:  CT BRAIN                            *** ADDENDUM 2017  ***    Addendum: Calcified extra-axial lesion along the right aspect of the   anterior interhemispheric falx, which is stable since 2016 and   likely represents a meningioma.      *** END OF ADDENDUM 2017  ***      PROCEDURE DATE:  2017          INTERPRETATION:  Clinical indication: Altered mental status.    Technique: CT axial images of the head were obtained without intravenous   contrast. Computer-reconstructed coronal and sagittal images were   obtained.    Comparison:  2016    Findings: The patient motion slightly degrades images. There is no acute   intracranial hemorrhage, extra-axial fluid collection, large cortical   infarct, mass effect or midline shift, given the extent of artifact.   Nonspecific mild periventricular and subcortical white matter lucencies   likely represent chronic microvascular ischemic changes. There is   moderate cerebral volume loss with commensurate ventricular dilatation.     There is no depressed skull fracture. Visualized paranasal sinuses and   tympanomastoid region are unremarkable. Previously noted right frontal   scalp soft tissue hematoma has resolved.     Impression:     No obvious acute intracranial hemorrhage, extra axial fluid collection or   displaced skull fracture, given the extent of artifact. If clinically   indicated and when patient is able to cooperate, a short-term follow-up   or an MRI may be obtained for further evaluation.        ***Please see the addendum at the top of this report. It may contain   additional important information or changes.****          VAINTA CYR M.D., ATTENDING RADIOLOGIST  This document has been electronically signed. 2017 11:17PM  Addend:  JOCELYN QUEZADA, ATTENDING RADIOLOGIST  This addendum was electronically signed on: 2017  3:16AM.        ASSESSMENT AND PLAN:      syncope.  advanced dementia with agitation.    haldol prn.  Physical therapy evaluation.  OOB to chair/ambulation with assistance only..  Plan of care was discussed with family. Questions answered.  Would continue to follow.  RADHA FORUTNE AND PATIENT DAUGHTER AT BED SIDE.
infectious diseases progress note:    DEVAN GASCA is a 82y y. o. Female patient    Patient with no significant overnight events    Allergies    No Known Allergies    Intolerances        ANTIBIOTICS/RELEVANT:  antimicrobials    immunologic:    OTHER:  heparin  Injectable 5000 Unit(s) SubCutaneous every 8 hours  multivitamin 1 Tablet(s) Oral daily  sodium chloride 0.9%. 1000 milliLiter(s) IV Continuous <Continuous>      Objective:  Vital Signs Last 24 Hrs  T(C): 36.6 (24 Jul 2017 07:32), Max: 36.6 (24 Jul 2017 07:32)  T(F): 97.9 (24 Jul 2017 07:32), Max: 97.9 (24 Jul 2017 07:32)  HR: 54 (24 Jul 2017 07:32) (50 - 79)  BP: 115/54 (24 Jul 2017 07:32) (115/54 - 150/79)  BP(mean): --  RR: 16 (24 Jul 2017 07:32) (14 - 16)  SpO2: 98% (24 Jul 2017 07:32) (95% - 100%)    T(C): 36.6 (24 Jul 2017 07:32), Max: 36.6 (24 Jul 2017 07:32)  T(C): 36.6 (24 Jul 2017 07:32), Max: 36.6 (22 Jul 2017 08:00)  T(C): 36.6 (24 Jul 2017 07:32), Max: 36.6 (22 Jul 2017 08:00)    PHYSICAL EXAM:  Constitutional:Well-developed, well nourished  Eyes:PERRLA, EOMI  Ear/Nose/Throat: oropharynx normal	  Neck:no JVD, no lymphadenopathy, supple  Respiratory: no accessory muscle use  Cardiovascular:RRR,   Gastrointestinal:soft, NT  Extremities:no clubbing, no cyanosis, edema absent      LABS:                        11.6   7.6   )-----------( 106      ( 24 Jul 2017 05:21 )             36.1       7.6 07-24 @ 05:21  9.6 07-21 @ 22:54      07-24    147<H>  |  112<H>  |  20  ----------------------------<  80  4.6   |  33<H>  |  1.10    Ca    8.7      24 Jul 2017 05:21  Mg     1.9     07-24              MICROBIOLOGY:          RADIOLOGY & ADDITIONAL STUDIES:
neuro cons dict.   syncope.  AMS, LIKELY DUE HYPOTHERMIA   AND HYPOTENSION.  ADVANCED DEMENTIA.  SEPSIS WORK UP.  DW PATIENT FAMILY AT BED SIDE.
Patient is a 82y old  Female who presents with a chief complaint of Unresponsive and hypothermic (2017 05:14)      INTERVAL HPI/OVERNIGHT EVENTS: Patient seen and examined. NAD. Lethargic but slowly opens eyes      Vital Signs Last 24 Hrs  T(C): 36.3 (2017 08:41), Max: 36.5 (2017 11:30)  T(F): 97.4 (2017 08:41), Max: 97.7 (2017 11:30)  HR: 60 (2017 08:41) (49 - 80)  BP: 125/82 (2017 08:41) (100/39 - 132/73)  BP(mean): --  RR: 14 (2017 08:41) (14 - 15)  SpO2: 98% (2017 08:41) (94% - 99%)I&O's Summary    2017 07:01  -  2017 07:00  --------------------------------------------------------  IN: 900 mL / OUT: 0 mL / NET: 900 mL        LABS:                        13.4   9.6   )-----------( 134      ( 2017 22:54 )             41.4     07    143  |  105  |  33<H>  ----------------------------<  130<H>  3.6   |  31  |  1.10    Ca    8.9      2017 22:54    TPro  6.6  /  Alb  3.4  /  TBili  0.6  /  DBili  x   /  AST  28  /  ALT  22  /  AlkPhos  135<H>  0721    PT/INR - ( 2017 22:54 )   PT: 12.1 sec;   INR: 1.11 ratio           Urinalysis Basic - ( 2017 22:57 )    Color: Yellow / Appearance: Clear / S.020 / pH: x  Gluc: x / Ketone: Negative  / Bili: Negative / Urobili: Negative   Blood: x / Protein: Negative / Nitrite: Negative   Leuk Esterase: Negative / RBC: x / WBC x   Sq Epi: x / Non Sq Epi: x / Bacteria: x      CAPILLARY BLOOD GLUCOSE              heparin  Injectable 5000 Unit(s) SubCutaneous every 8 hours  multivitamin 1 Tablet(s) Oral daily  sodium chloride 0.9%. 1000 milliLiter(s) IV Continuous <Continuous>      REVIEW OF SYSTEMS: unobtainable due to lethargy      Consultant(s) Notes Reviewed:  [ x] YES  [ ] NO    PHYSICAL EXAM:  GENERAL: NAD, ill-appearing, appears older than her stated age  HEAD:  Atraumatic, Normocephalic  EYES: EOMI, PERRLA, conjunctiva and sclera clear  ENMT: No tonsillar erythema, exudates, or enlargement; Moist mucous membranes  NECK: Supple, No JVD  NERVOUS SYSTEM:  lethargic  CHEST/LUNG: decreased BS b/l  HEART: Regular rate and rhythm  ABDOMEN: Soft, Nontender, Nondistended; Bowel sounds present  EXTREMITIES:  No clubbing, cyanosis, or edema  LYMPH: No lymphadenopathy noted  SKIN: No rashes      Advanced care planning discussed with patient/family [x ] YES   [ ] NO

## 2017-07-24 NOTE — PROGRESS NOTE ADULT - ATTENDING COMMENTS
molst form signed and reviewed with daughter  pt is dnr/dni  request hospice eval as outpt  no feeding tubes

## 2017-07-24 NOTE — PROGRESS NOTE ADULT - PROBLEM SELECTOR PLAN 1
seen by neuro  possibly slightly improved today  possibly secondary to hypothermia/sepsis
Neurology consult with Dr. QUIÑONEZ
seen by neuro  possibly slightly improved today  possibly secondary to hypothermia/sepsis- which is resolved  no signs of sepsis  id noted, no abx
no obvious infectious process driving this. Recommend no abx at this point.    Thank you for consulting us and involving us in the management of this most interesting and challenging case.     Please Call with any further questions

## 2017-07-24 NOTE — PROGRESS NOTE ADULT - PROBLEM SELECTOR PLAN 2
resolved  off hypothermia blanket  r/o sepsis  f/u cultures  monitor off abx
hyperthermia blanket  doubt sepsis
resolved  off hypothermia blanket  r/o sepsis, id noted  f/u cultures are neg  monitor off abx

## 2017-07-24 NOTE — SWALLOW BEDSIDE ASSESSMENT ADULT - SWALLOW EVAL: RECOMMENDED FEEDING/EATING TECHNIQUES
hard swallow w/ each bite or sip/maintain upright posture during/after eating for 30 mins/oral hygiene/alternate food with liquid/position upright (90 degrees)/crush medication (when feasible)

## 2017-07-24 NOTE — PHYSICAL THERAPY INITIAL EVALUATION ADULT - ADDITIONAL COMMENTS
As per pt's daughter, pt requires assist at all times and is able to ambulate short distances occasionally.

## 2017-07-24 NOTE — GOALS OF CARE CONVERSATION - PERSONAL ADVANCE DIRECTIVE - CONVERSATION DETAILS
met daughter w pt, pt has molst form dnr dni, reviewed pt plan of care: declines peg if needed. wants pt treated conservatively, to come home. molst reviewed, daughter agrees to dnr dni no TF, limited medical. daughter not ready for home hospice, but would like information of benefit. Tere, , aware of daughters concerns, will have a soc worker on case & giving her HCN # when ready for home hospice. daughter continues to want pt treated in hospital if needed. contact # given.

## 2017-07-24 NOTE — PHYSICAL THERAPY INITIAL EVALUATION ADULT - GENERAL OBSERVATIONS, REHAB EVAL
Pt received in bed, not able to participate with physical therapy at this time secondary to lethargy and dementia.

## 2017-07-25 ENCOUNTER — TRANSCRIPTION ENCOUNTER (OUTPATIENT)
Age: 82
End: 2017-07-25

## 2017-07-25 VITALS
OXYGEN SATURATION: 99 % | SYSTOLIC BLOOD PRESSURE: 153 MMHG | DIASTOLIC BLOOD PRESSURE: 84 MMHG | TEMPERATURE: 98 F | RESPIRATION RATE: 18 BRPM | HEART RATE: 56 BPM

## 2017-07-25 LAB
ANION GAP SERPL CALC-SCNC: 6 MMOL/L — SIGNIFICANT CHANGE UP (ref 5–17)
BUN SERPL-MCNC: 15 MG/DL — SIGNIFICANT CHANGE UP (ref 7–23)
CALCIUM SERPL-MCNC: 8.7 MG/DL — SIGNIFICANT CHANGE UP (ref 8.5–10.1)
CHLORIDE SERPL-SCNC: 106 MMOL/L — SIGNIFICANT CHANGE UP (ref 96–108)
CO2 SERPL-SCNC: 30 MMOL/L — SIGNIFICANT CHANGE UP (ref 22–31)
CREAT SERPL-MCNC: 1.1 MG/DL — SIGNIFICANT CHANGE UP (ref 0.5–1.3)
GLUCOSE SERPL-MCNC: 127 MG/DL — HIGH (ref 70–99)
POTASSIUM SERPL-MCNC: 3.4 MMOL/L — LOW (ref 3.5–5.3)
POTASSIUM SERPL-SCNC: 3.4 MMOL/L — LOW (ref 3.5–5.3)
SODIUM SERPL-SCNC: 142 MMOL/L — SIGNIFICANT CHANGE UP (ref 135–145)

## 2017-07-25 PROCEDURE — 85610 PROTHROMBIN TIME: CPT

## 2017-07-25 PROCEDURE — 97162 PT EVAL MOD COMPLEX 30 MIN: CPT

## 2017-07-25 PROCEDURE — 82550 ASSAY OF CK (CPK): CPT

## 2017-07-25 PROCEDURE — 87040 BLOOD CULTURE FOR BACTERIA: CPT

## 2017-07-25 PROCEDURE — 70450 CT HEAD/BRAIN W/O DYE: CPT

## 2017-07-25 PROCEDURE — 80053 COMPREHEN METABOLIC PANEL: CPT

## 2017-07-25 PROCEDURE — 80048 BASIC METABOLIC PNL TOTAL CA: CPT

## 2017-07-25 PROCEDURE — 87086 URINE CULTURE/COLONY COUNT: CPT

## 2017-07-25 PROCEDURE — 99285 EMERGENCY DEPT VISIT HI MDM: CPT | Mod: 25

## 2017-07-25 PROCEDURE — 81003 URINALYSIS AUTO W/O SCOPE: CPT

## 2017-07-25 PROCEDURE — 82746 ASSAY OF FOLIC ACID SERUM: CPT

## 2017-07-25 PROCEDURE — 93005 ELECTROCARDIOGRAM TRACING: CPT

## 2017-07-25 PROCEDURE — 97161 PT EVAL LOW COMPLEX 20 MIN: CPT

## 2017-07-25 PROCEDURE — 71045 X-RAY EXAM CHEST 1 VIEW: CPT

## 2017-07-25 PROCEDURE — 85027 COMPLETE CBC AUTOMATED: CPT

## 2017-07-25 PROCEDURE — 93306 TTE W/DOPPLER COMPLETE: CPT

## 2017-07-25 PROCEDURE — 96372 THER/PROPH/DIAG INJ SC/IM: CPT | Mod: XU

## 2017-07-25 PROCEDURE — 86850 RBC ANTIBODY SCREEN: CPT

## 2017-07-25 PROCEDURE — 84443 ASSAY THYROID STIM HORMONE: CPT

## 2017-07-25 PROCEDURE — 82607 VITAMIN B-12: CPT

## 2017-07-25 PROCEDURE — 83735 ASSAY OF MAGNESIUM: CPT

## 2017-07-25 PROCEDURE — 83036 HEMOGLOBIN GLYCOSYLATED A1C: CPT

## 2017-07-25 PROCEDURE — 84436 ASSAY OF TOTAL THYROXINE: CPT

## 2017-07-25 PROCEDURE — 96365 THER/PROPH/DIAG IV INF INIT: CPT

## 2017-07-25 PROCEDURE — 86900 BLOOD TYPING SEROLOGIC ABO: CPT

## 2017-07-25 PROCEDURE — 86901 BLOOD TYPING SEROLOGIC RH(D): CPT

## 2017-07-25 PROCEDURE — 84480 ASSAY TRIIODOTHYRONINE (T3): CPT

## 2017-07-25 PROCEDURE — 84484 ASSAY OF TROPONIN QUANT: CPT

## 2017-07-25 PROCEDURE — 83605 ASSAY OF LACTIC ACID: CPT

## 2017-07-25 RX ORDER — POTASSIUM CHLORIDE 20 MEQ
40 PACKET (EA) ORAL ONCE
Qty: 0 | Refills: 0 | Status: COMPLETED | OUTPATIENT
Start: 2017-07-25 | End: 2017-07-25

## 2017-07-25 RX ORDER — QUETIAPINE FUMARATE 200 MG/1
1 TABLET, FILM COATED ORAL
Qty: 30 | Refills: 0 | OUTPATIENT
Start: 2017-07-25 | End: 2017-08-24

## 2017-07-25 RX ORDER — ASPIRIN/CALCIUM CARB/MAGNESIUM 324 MG
1 TABLET ORAL
Qty: 0 | Refills: 0 | COMMUNITY
Start: 2017-07-25

## 2017-07-25 RX ORDER — HEPARIN SODIUM 5000 [USP'U]/ML
5000 INJECTION INTRAVENOUS; SUBCUTANEOUS EVERY 8 HOURS
Qty: 0 | Refills: 0 | Status: DISCONTINUED | OUTPATIENT
Start: 2017-07-25 | End: 2017-07-25

## 2017-07-25 RX ORDER — FAMOTIDINE 10 MG/ML
1 INJECTION INTRAVENOUS
Qty: 0 | Refills: 0 | COMMUNITY
Start: 2017-07-25

## 2017-07-25 RX ADMIN — HEPARIN SODIUM 5000 UNIT(S): 5000 INJECTION INTRAVENOUS; SUBCUTANEOUS at 06:05

## 2017-07-25 RX ADMIN — Medication 40 MILLIEQUIVALENT(S): at 12:30

## 2017-07-25 RX ADMIN — FAMOTIDINE 20 MILLIGRAM(S): 10 INJECTION INTRAVENOUS at 11:00

## 2017-07-25 RX ADMIN — Medication 1 TABLET(S): at 11:00

## 2017-07-25 RX ADMIN — HEPARIN SODIUM 5000 UNIT(S): 5000 INJECTION INTRAVENOUS; SUBCUTANEOUS at 13:04

## 2017-07-25 RX ADMIN — Medication 81 MILLIGRAM(S): at 11:00

## 2017-07-25 NOTE — DISCHARGE NOTE ADULT - SECONDARY DIAGNOSIS.
Alzheimer's dementia with behavioral disturbance, unspecified timing of dementia onset Hypothermia Dehydration

## 2017-07-25 NOTE — DISCHARGE NOTE ADULT - PATIENT PORTAL LINK FT
“You can access the FollowHealth Patient Portal, offered by [facility], by registering with the following website: [email or web address]”

## 2017-07-25 NOTE — DISCHARGE NOTE ADULT - CARE PROVIDERS DIRECT ADDRESSES
[email or web address],[email or web address],DirectAddress_Unknown

## 2017-07-25 NOTE — DISCHARGE NOTE ADULT - HOSPITAL COURSE
admitted for AMS / episode of unresponsiveness  likely multifactorial - no apparent etiology  hypothermia - resolved spontaneously  no sepsis  doubt seizure per neuro  DC after neuro and ID clerance

## 2017-07-25 NOTE — DISCHARGE NOTE ADULT - VISION (WITH CORRECTIVE LENSES IF THE PATIENT USUALLY WEARS THEM):
Normal vision: sees adequately in most situations; can see medication labels, newsprint/nonresponsive

## 2017-07-25 NOTE — CHART NOTE - NSCHARTNOTEFT_GEN_A_CORE
Card Cons Dict.  - Ac CVA, s/p tPA  - Dementia/HTN  - Transient Asymp Bradycardia  Stable cardiacwise. Check Echo. DAWIT Blakely MD  Job #[id number]
Do you have Advance Directives (HCP / LV / Organ donation / Documentation of oral advance Directive):   (  x  )  yes    (      )    NO                                                                            Do you have LV - Living will :                                                                                                                                             (    )  yes    (   x   )   No    Do you have HCP - Health Care Proxy:                                                                                                                            (   x  )  yes   (       ) N0    Do you have DNR- Do Not Resuscitate :                                                                                                                           (   x   )  yes  (        )  No    Do you have DNI- Do Not intubate  :                                                                                                                               (   x   )  yes   (       ) No    Do you have MOLST - Medical orders for Life sustaining treatment  :                                                                    (   x   ) yes    (       ) No    Decision Maker :  (     ) Patient     (     x )  HCA   (     ) Public Health Law Surrogate     (      ) Surrogate  (       ) Guardian    Goals of Care :  (      )   Complete Care     (       ) No Limitations                              (       )   Comfort Care       (       )  Hospice                               (  x    )   Limited medical Intervention / s    Medical Interventions :   (        )   CPR       (      x  )  DNR                                               (        )  Intubation with MV - Mechanical Ventilation  (      ) BIPAP/CPAP    (    x     )   DNI                                               (         )  Artificial Nutrition -  IVF, TPN / PPN, Tube Feeds             (    x     )   No Feeding Tube                                                (    x    ) Use Antibiotics                         (          ) No Antibiotics                                                (      x   ) Blood and Blood Products     (         )   No Blood or Blood products                                                (          )  Dialysis                                    (   x      )  No Dialysis                                                (          )  Medical Management only  (    x     )  No Invasive Interventions or Surgery  Time spent :                        ( x      ) up to 30 minutes                       (           )   more than 30 minutes  Goals of care by palliative RN was reviewed

## 2017-07-25 NOTE — DISCHARGE NOTE ADULT - CARE PROVIDER_API CALL
Felix Quijano), Internal Medicine  [address] [place] [address]  Phone: [phone]  Fax: [phone]    Kris Tellez (ELIZ), Neurology  [address] [place] [address]  Phone: [phone]  Fax: [phone]    Ami Grubbs), Critical Care Medicine; [facility]; Internal Medicine  [address] [place] [address]  Phone: [phone]  Fax: [phone]

## 2017-07-25 NOTE — DISCHARGE NOTE ADULT - CARE PLAN
Principal Discharge DX:	Altered mental state  Goal:	better mental state  Instructions for follow-up, activity and diet:	follow up with PMD Dr. LEE  Secondary Diagnosis:	Alzheimer's dementia with behavioral disturbance, unspecified timing of dementia onset  Secondary Diagnosis:	Hypothermia  Secondary Diagnosis:	Dehydration

## 2017-07-25 NOTE — DISCHARGE NOTE ADULT - MEDICATION SUMMARY - MEDICATIONS TO TAKE
I will START or STAY ON the medications listed below when I get home from the hospital:    aspirin 81 mg oral tablet, chewable  -- 1 tab(s) by mouth once a day  -- Indication: For Prevent heart disease    famotidine 20 mg oral tablet  -- 1 tab(s) by mouth once a day  -- Indication: For gerd    Multiple Vitamins oral tablet  -- 1 tab(s) by mouth once a day  -- Indication: For Suplement

## 2017-07-27 DIAGNOSIS — G30.9 ALZHEIMER'S DISEASE, UNSPECIFIED: ICD-10-CM

## 2017-07-27 DIAGNOSIS — E86.0 DEHYDRATION: ICD-10-CM

## 2017-07-27 DIAGNOSIS — R41.82 ALTERED MENTAL STATUS, UNSPECIFIED: ICD-10-CM

## 2017-07-27 DIAGNOSIS — F02.81 DEMENTIA IN OTHER DISEASES CLASSIFIED ELSEWHERE, UNSPECIFIED SEVERITY, WITH BEHAVIORAL DISTURBANCE: ICD-10-CM

## 2017-07-27 DIAGNOSIS — R68.0 HYPOTHERMIA, NOT ASSOCIATED WITH LOW ENVIRONMENTAL TEMPERATURE: ICD-10-CM

## 2017-07-27 DIAGNOSIS — R13.10 DYSPHAGIA, UNSPECIFIED: ICD-10-CM

## 2017-07-27 DIAGNOSIS — Z66 DO NOT RESUSCITATE: ICD-10-CM

## 2017-07-27 DIAGNOSIS — R55 SYNCOPE AND COLLAPSE: ICD-10-CM

## 2017-07-27 DIAGNOSIS — Z51.5 ENCOUNTER FOR PALLIATIVE CARE: ICD-10-CM

## 2017-07-27 LAB
CULTURE RESULTS: SIGNIFICANT CHANGE UP
CULTURE RESULTS: SIGNIFICANT CHANGE UP
SPECIMEN SOURCE: SIGNIFICANT CHANGE UP
SPECIMEN SOURCE: SIGNIFICANT CHANGE UP

## 2017-07-31 DIAGNOSIS — G93.41 METABOLIC ENCEPHALOPATHY: ICD-10-CM

## 2017-08-08 ENCOUNTER — APPOINTMENT (OUTPATIENT)
Dept: HOME HEALTH SERVICES | Facility: HOME HEALTH | Age: 82
End: 2017-08-08
Payer: MEDICARE

## 2017-08-08 VITALS
HEART RATE: 52 BPM | SYSTOLIC BLOOD PRESSURE: 90 MMHG | DIASTOLIC BLOOD PRESSURE: 60 MMHG | BODY MASS INDEX: 21.6 KG/M2 | WEIGHT: 110 LBS | OXYGEN SATURATION: 97 % | TEMPERATURE: 97.4 F | HEIGHT: 60 IN | RESPIRATION RATE: 16 BRPM

## 2017-08-08 DIAGNOSIS — Z87.891 PERSONAL HISTORY OF NICOTINE DEPENDENCE: ICD-10-CM

## 2017-08-08 PROCEDURE — 99497 ADVNCD CARE PLAN 30 MIN: CPT | Mod: 33

## 2017-08-08 PROCEDURE — G0506: CPT

## 2017-08-08 PROCEDURE — G0438: CPT

## 2017-08-08 PROCEDURE — 99345 HOME/RES VST NEW HIGH MDM 75: CPT | Mod: 25

## 2017-09-01 ENCOUNTER — MOBILE ON CALL (OUTPATIENT)
Age: 82
End: 2017-09-01

## 2017-09-29 ENCOUNTER — APPOINTMENT (OUTPATIENT)
Dept: RADIOLOGY | Facility: HOSPITAL | Age: 82
End: 2017-09-29

## 2017-09-29 ENCOUNTER — APPOINTMENT (OUTPATIENT)
Dept: SPEECH THERAPY | Facility: HOSPITAL | Age: 82
End: 2017-09-29

## 2017-10-19 ENCOUNTER — CLINICAL ADVICE (OUTPATIENT)
Age: 82
End: 2017-10-19

## 2017-10-20 ENCOUNTER — CLINICAL ADVICE (OUTPATIENT)
Age: 82
End: 2017-10-20

## 2017-11-13 ENCOUNTER — APPOINTMENT (OUTPATIENT)
Dept: HOME HEALTH SERVICES | Facility: HOME HEALTH | Age: 82
End: 2017-11-13
Payer: MEDICARE

## 2017-11-13 VITALS
HEART RATE: 84 BPM | SYSTOLIC BLOOD PRESSURE: 130 MMHG | RESPIRATION RATE: 16 BRPM | DIASTOLIC BLOOD PRESSURE: 80 MMHG | TEMPERATURE: 98 F

## 2017-11-13 DIAGNOSIS — I69.319 UNSPECIFIED SYMPTOMS AND SIGNS INVOLVING COGNITIVE FUNCTIONS FOLLOWING CEREBRAL INFARCTION: ICD-10-CM

## 2017-11-13 DIAGNOSIS — Z92.82 STATUS POST ADMINISTRATION OF TPA (RTPA) IN A DIFFERENT FACILITY WITHIN THE LAST 24 HRS PRIOR TO ADMISSION TO CURRENT FACILITY: ICD-10-CM

## 2017-11-13 PROCEDURE — 90662 IIV NO PRSV INCREASED AG IM: CPT

## 2017-11-13 PROCEDURE — 99350 HOME/RES VST EST HIGH MDM 60: CPT | Mod: 25

## 2017-11-13 PROCEDURE — G0008: CPT

## 2017-11-13 RX ORDER — QUETIAPINE FUMARATE 25 MG/1
25 TABLET ORAL
Qty: 30 | Refills: 0 | Status: DISCONTINUED | COMMUNITY
Start: 2017-07-25

## 2017-11-13 RX ORDER — LORATADINE 5 MG
17 TABLET,CHEWABLE ORAL
Qty: 1 | Refills: 5 | Status: DISCONTINUED | COMMUNITY
Start: 2017-08-08 | End: 2017-11-13

## 2018-01-22 ENCOUNTER — CLINICAL ADVICE (OUTPATIENT)
Age: 83
End: 2018-01-22

## 2018-01-22 ENCOUNTER — APPOINTMENT (OUTPATIENT)
Dept: HOME HEALTH SERVICES | Facility: HOME HEALTH | Age: 83
End: 2018-01-22

## 2018-01-30 ENCOUNTER — APPOINTMENT (OUTPATIENT)
Dept: HOME HEALTH SERVICES | Facility: HOME HEALTH | Age: 83
End: 2018-01-30
Payer: MEDICARE

## 2018-01-30 VITALS
DIASTOLIC BLOOD PRESSURE: 80 MMHG | SYSTOLIC BLOOD PRESSURE: 132 MMHG | HEART RATE: 64 BPM | RESPIRATION RATE: 16 BRPM | TEMPERATURE: 98.3 F

## 2018-01-30 DIAGNOSIS — H57.12 OCULAR PAIN, LEFT EYE: ICD-10-CM

## 2018-01-30 PROCEDURE — 99349 HOME/RES VST EST MOD MDM 40: CPT

## 2018-04-03 ENCOUNTER — MEDICATION RENEWAL (OUTPATIENT)
Age: 83
End: 2018-04-03

## 2018-04-03 RX ORDER — LORAZEPAM 0.5 MG/1
0.5 TABLET ORAL EVERY 6 HOURS
Refills: 0 | Status: DISCONTINUED | COMMUNITY
Start: 2018-01-30 | End: 2018-04-03

## 2018-04-16 ENCOUNTER — APPOINTMENT (OUTPATIENT)
Dept: HOME HEALTH SERVICES | Facility: HOME HEALTH | Age: 83
End: 2018-04-16
Payer: MEDICARE

## 2018-04-16 VITALS
HEART RATE: 78 BPM | DIASTOLIC BLOOD PRESSURE: 80 MMHG | SYSTOLIC BLOOD PRESSURE: 120 MMHG | RESPIRATION RATE: 16 BRPM | TEMPERATURE: 98 F

## 2018-04-16 PROCEDURE — 99349 HOME/RES VST EST MOD MDM 40: CPT

## 2018-06-21 ENCOUNTER — APPOINTMENT (OUTPATIENT)
Dept: HOME HEALTH SERVICES | Facility: HOME HEALTH | Age: 83
End: 2018-06-21
Payer: MEDICARE

## 2018-06-21 VITALS
RESPIRATION RATE: 14 BRPM | TEMPERATURE: 97.8 F | SYSTOLIC BLOOD PRESSURE: 130 MMHG | HEART RATE: 76 BPM | OXYGEN SATURATION: 98 % | DIASTOLIC BLOOD PRESSURE: 72 MMHG

## 2018-06-21 PROCEDURE — 99349 HOME/RES VST EST MOD MDM 40: CPT

## 2018-08-08 NOTE — PROGRESS NOTE ADULT - PROVIDER SPECIALTY LIST ADULT
Neurology Received patient from IR With shivering .  /55, P 94, T 98.2 to 101.3.  Pulse ox 100%, and

## 2018-08-23 ENCOUNTER — APPOINTMENT (OUTPATIENT)
Dept: HOME HEALTH SERVICES | Facility: HOME HEALTH | Age: 83
End: 2018-08-23
Payer: MEDICARE

## 2018-08-23 VITALS
SYSTOLIC BLOOD PRESSURE: 110 MMHG | RESPIRATION RATE: 15 BRPM | HEART RATE: 78 BPM | TEMPERATURE: 98 F | DIASTOLIC BLOOD PRESSURE: 70 MMHG | OXYGEN SATURATION: 98 %

## 2018-08-23 PROCEDURE — 99349 HOME/RES VST EST MOD MDM 40: CPT

## 2018-11-04 NOTE — ED ADULT TRIAGE NOTE - ESI TRIAGE ACUITY LEVEL, MLM
2
Constitutional:  NO fever  Eyes:  No visual changes  ENMT: No neck pain or stiffness  Cardiac:  No chest pain  Respiratory:  See HPI  GI:  No nausea, vomiting, diarrhea or abdominal pain.  :  No dysuria, frequency or burning.  MS:  No back pain.  Neuro:  No headache   Skin:  No skin rash  Except as documented in the HPI,  all other systems are negative

## 2018-11-14 ENCOUNTER — APPOINTMENT (OUTPATIENT)
Dept: HOME HEALTH SERVICES | Facility: HOME HEALTH | Age: 83
End: 2018-11-14
Payer: MEDICARE

## 2018-11-14 VITALS
DIASTOLIC BLOOD PRESSURE: 70 MMHG | TEMPERATURE: 98.4 F | OXYGEN SATURATION: 98 % | SYSTOLIC BLOOD PRESSURE: 110 MMHG | RESPIRATION RATE: 14 BRPM | HEART RATE: 60 BPM

## 2018-11-14 PROCEDURE — G0008: CPT

## 2018-11-14 PROCEDURE — 90662 IIV NO PRSV INCREASED AG IM: CPT

## 2018-11-14 PROCEDURE — 99349 HOME/RES VST EST MOD MDM 40: CPT | Mod: 25

## 2018-11-23 ENCOUNTER — OTHER (OUTPATIENT)
Age: 83
End: 2018-11-23

## 2018-12-24 ENCOUNTER — APPOINTMENT (OUTPATIENT)
Dept: HOME HEALTH SERVICES | Facility: HOME HEALTH | Age: 83
End: 2018-12-24

## 2019-01-15 ENCOUNTER — APPOINTMENT (OUTPATIENT)
Dept: HOME HEALTH SERVICES | Facility: HOME HEALTH | Age: 84
End: 2019-01-15
Payer: MEDICARE

## 2019-01-15 VITALS — RESPIRATION RATE: 14 BRPM | DIASTOLIC BLOOD PRESSURE: 60 MMHG | HEART RATE: 68 BPM | SYSTOLIC BLOOD PRESSURE: 110 MMHG

## 2019-01-15 VITALS — TEMPERATURE: 97.2 F

## 2019-01-15 DIAGNOSIS — R15.9 UNSPECIFIED URINARY INCONTINENCE: ICD-10-CM

## 2019-01-15 DIAGNOSIS — R32 UNSPECIFIED URINARY INCONTINENCE: ICD-10-CM

## 2019-01-15 DIAGNOSIS — Z78.9 OTHER SPECIFIED HEALTH STATUS: ICD-10-CM

## 2019-01-15 DIAGNOSIS — M20.41 OTHER HAMMER TOE(S) (ACQUIRED), RIGHT FOOT: ICD-10-CM

## 2019-01-15 PROCEDURE — 99349 HOME/RES VST EST MOD MDM 40: CPT

## 2019-01-15 NOTE — LETTER HEADER
[Care Plan reviewed and provided to patient/caregiver] : Care plan reviewed and provided to patient/caregiver [Care Plan reviewed every ___ weeks] : Care plan reviewed every [unfilled] weeks [Care Plan managed/Care coordinated by: ___] : Care plan managed/Care coordinated by: [unfilled] [Patient/Caregiver agrees to have other providers send summary of their care to this office] : Patient/caregiver agrees to have other providers send summary of their care to this office

## 2019-01-21 PROBLEM — R32 BOWEL AND BLADDER INCONTINENCE: Status: ACTIVE | Noted: 2017-08-08

## 2019-01-21 PROBLEM — M20.41 ACQUIRED HAMMERTOE OF RIGHT FOOT: Status: ACTIVE | Noted: 2017-10-20

## 2019-01-21 PROBLEM — Z78.9 PATIENT INCAPABLE OF MAKING INFORMED DECISIONS: Status: ACTIVE | Noted: 2017-08-08

## 2019-01-21 NOTE — PHYSICAL EXAM
[No Acute Distress] : no acute distress [Normal Sclera/Conjunctiva] : normal sclera/conjunctiva [Normal Outer Ear/Nose] : the ears and nose were normal in appearance [Normal Oropharynx] : the oropharynx was normal [No JVD] : no jugular venous distention [No Respiratory Distress] : no respiratory distress [Clear to Auscultation] : lungs were clear to auscultation bilaterally [No Accessory Muscle Use] : no accessory muscle use [Normal Rate] : heart rate was normal  [Regular Rhythm] : with a regular rhythm [Normal S1, S2] : normal S1 and S2 [No Murmurs] : no murmurs heard [No LE Varicosities] : there were no varicosital changes in the lower extremities [No Edema] : there was no peripheral edema [Normal Appearance] : normal in appearance [Normal Bowel Sounds] : normal bowel sounds [Non Tender] : non-tender [Soft] : abdomen soft [Not Distended] : not distended [No HSM] : no hepato-splenomegaly [No Masses] : no abdominal mass palpated [No Hernias] : no hernia was discovered [Normal Anterior Cervical Nodes] : no anterior cervical lymphadenopathy [Kyphosis] :  kyphosis present [No Rash] : no rash [No Skin Lesions] : no skin lesions [de-identified] : awake during visit, not able to verbalize; frail w/  temporal wasting [de-identified] : fatty lipoma mid back  [de-identified] : upper and lower extremity muscle wasting  [de-identified] : unable to assess [de-identified] : alert to name only

## 2019-01-21 NOTE — COUNSELING
[Underweight (BMI < 18.5)] : Underweight (BMI < 18.5) [Non - Smoker] : non-smoker [Medical alert] : medical alert [Not Indicated] : not indicated [Decrease hospital use] : decrease hospital use [Minimize unnecessary interventions] : minimize unnecessary interventions [Maintain functional ability] : maintain functional ability [Discussed disease trajectory with patient/caregiver] : discussed disease trajectory with patient/caregiver [Likely to achieve goals/desired outcomes] : likely to achieve goals/desired outcomes [Patient/Caregiver has ___ understanding of disease process] : patient/caregiver has [unfilled] understanding of disease process [Completed DNR] : completed DNR [Completed Medical Orders for Life-Sustaining Treatment] : completed medical orders for life-sustaining treatment [DNR] : Code Status: DNR [Limited] : Treatment Guidelines: Limited [DNI] : Intubation: DNI [[facility] Date: _____] [facility]/last verification date: [unfilled] [_____] : HCP: [unfilled] [FreeTextEntry4] : comfort measures

## 2019-01-21 NOTE — HISTORY OF PRESENT ILLNESS
[Family Member] : family member [FreeTextEntry1] : Dementia [FreeTextEntry2] : HPI/ROS with daughter Viviana Rowan\par \par PMH: Dementia, Dysphagia, HTN (off medications), Constipation, Meningioma, Aortic calcification,  S/P CVA with TPA administration (2014)\par \par Pt alert to name, with no c/o, fighting to have vital sign taking\par Daughter reports pt still doing well-  in usual state of health; still  walking three times a day around house with daughter and walker\par \par Dementia- agitation still controlled Lorazepam; appetite good- has difficulty swallowing- coughs with liquids, tolerates soft diet, weight stable- pt frail; incontinent of bowel and bladder- using prunes daily, has BM every three days- has not had BM in 3 days; per daughter  pt had hyperbaric oxygen therapy for dementia in 2013 "which saved her brain"\par \par Meningioma- on CT head from 7/2017 CT results also report this was visualized in 2014 & 2016- no treatment required per daughter as pt has seen neurology for same\par Daughter reports\par No recent fever, chills, sob

## 2019-01-21 NOTE — REASON FOR VISIT
[Follow-Up] : a follow-up visit [Family Member] : family member [Pre-Visit Preparation] : pre-visit preparation was done [Intercurrent Specialty/Sub-specialty Visits] : the patient has no intercurrent specialty/sub-specialty visits [FreeTextEntry1] : for chronic conditions

## 2019-01-21 NOTE — REVIEW OF SYSTEMS
[Fatigue] : fatigue [Constipation] : constipation [Incontinence] : incontinence [Muscle Weakness] : muscle weakness [Confusion] : confusion [Memory Loss] : memory loss [Negative] : Heme/Lymph [FreeTextEntry7] : incontinence [de-identified] : as noted in HPI

## 2019-01-21 NOTE — DATA REVIEWED
[FreeTextEntry1] : EXAM:  CHEST PORTABLE IMMED                         \par PROCEDURE DATE:  07/21/2017 \par      INTERPRETATION:  Altered mental status.  AP chest. Prior 6/4/2014.  Rotated. Chin obscures right apex. Normal heart size. Unfolding  calcination thoracic aorta. No consolidation or effusion. Old fracture  left sixth rib.  Impression: Limited. No acute pleural-parenchymal process.\par         CHRISTINA RODRIGUEZ M.D., ATTENDING RADIOLOGIST This document has been electronically signed. Jul 22 2017  8:28AM          \par \par Patient  ID:  [id number]          Patient  Name:  DEVAN GASCA  \par YOB: 1935          Sex:  F\par   EXAM:    ECHO  TTE  W/O  CON  COMP  W/DOPPLR\par PROCEDURE  DATE:    07/25/2017\par INTERPRETATION:    Ordering  Physician:  RUBY NEIL  [id number]\par Indication:  EKG  abnormalities.  Cardiac  arrhythmias.  Cardiac  murmur.\par Technician:  BRISA.\par Study  Quality:  Technical  difficult  study.\par A  complete  echocardiographic  study  was  performed  utilizing  standard  protocol\par including  spectral  and  color  Doppler  in  all  echocardiographic  windows.\par Height:  Not  available\par Weight:  54kg\par BSA:  1.4m2\par Blood  Pressure:  111/73\par MEASUREMENTS\par IVS:  0.7cm\par PWT:  0.9cm\par LA:  2.7cm\par AO:  3.0cm\par LVIDd:  4.0\par LVIDs:  2.5cm\par LVEF:  55-60%.\par PASP:  24mm  Hg\par FINDINGS\par Left  Ventricle:  Normal  in  size  and  normal  LV  systolic  function  with\par estimated  LVEF  55-60%.\par Right  Ventricle:  Normal  in  size  and  normal  RV  systolic  function.\par Left  Atrium:  Normal  in  size.\par Right  Atrium:  Normal  in  size.\par Mitral  Valve:  Mild  mitral  annulare    calcification  is  present.  Mitral  valve\par is  mildly  calcified  and  no  evidence  of  any  mitral  stenosis.  Mild  mitral\par regurgitation  is  present.\par Aortic  Valve:  Aortic  Root  is  mild  sclerotic  and  of  normal  dimension.  Aortic\par valve  is  mildly  calcified  and  no  evidence  of  any  aortic  stenosis.\par Tricuspid  Valve:  Mild  tricuspid  regurgitation  with  calculated    PA  systolic\par pressure  34  mmHg  is  present.\par Pulmonic  Valve:  Trace  pulmonary  regurgitation  is  present.\par Diastolic  Function:  Grade  1  LV  diastolic  dysfunction  is  present.\par Pericardium/Pleura:  No  evidence  of  any  pericardial  effusion.\par No  intracardiac  thrombus  or  vegetations  and    tumor.\par CONCLUSIONS:\par LV  size  is  normal    and  normal  LV  systolic  function.\par Grade  1  LV  diastolic  dysfunction  is  present.\par Mild  mitral  regurgitation  is  present.\par Mild  tricuspid  regurgitation  is  present.  No  evidence  of  any  pulmonary\par hypertension.\par Correlate  clinically  above  findings.\par TANISHA LEPE M.D.\par This  document  has  been  electronically  signed\par \par Patient  ID:  [id number]          Patient  Name:  DEVAN GASCA  \par YOB: 1935          Sex:  F\par  EXAM:    CT  BRAIN\par ***  ADDENDUM  07/22/2017    ***\par Addendum:  Calcified  extra-axial  lesion  along  the  right  aspect  of  the\par anterior  interhemispheric  falx,  which  is  stable  since  6/26/2016  and  likely\par represents  a  meningioma.\par ***  END  OF  ADDENDUM  07/22/2017    ***\par PROCEDURE  DATE:    07/21/2017\par INTERPRETATION:    Clinical  indication:  Altered  mental  status.\par Technique:  CT  axial  images  of  the  head  were  obtained  without  intravenous\par contrast.  Computer-reconstructed  coronal  and  sagittal  images  were  obtained.\par Comparison:    6/26/2016\par Findings:  The  patient  motion  slightly  degrades  images.  There  is  no  acute\par intracranial  hemorrhage,  extra-axial  fluid  collection,  large  cortical\par infarct,  mass  effect  or  midline  shift,  given  the  extent  of  artifact.\par Nonspecific  mild  periventricular  and  subcortical  white  matter  lucencies\par likely  represent  chronic  microvascular  ischemic  changes.  There  is  moderate\par cerebral  volume  loss  with  commensurate  ventricular  dilatation.\par There  is  no  depressed  skull  fracture.  Visualized  paranasal  sinuses  and\par tympanomastoid  region  are  unremarkable.  Previously  noted  right  frontal  scalp\par soft  tissue  hematoma  has  resolved.\par Impression:\par No  obvious  acute  intracranial  hemorrhage,  extra  axial  fluid  collection  or\par displaced  skull  fracture,  given  the  extent  of  artifact.  If  clinically\par indicated  and  when  patient  is  able  to  cooperate,  a  short-term  follow-up  or\par an  MRI  may  be  obtained  for  further  evaluation.\par ***Please  see  the  addendum  at  the  top  of  this  report.  It  may  contain\par additional  important  information  or  changes.****\par VANITA CYR M.D.,  ATTENDING  RADIOLOGIST\par This  document  has  been  electronically  signed.  Jul 21 2017  11:17PM\par Addend:  VANITA CYR M.D.,  ATTENDING  RADIOLOGIST\amanuel This  addendum  was  electronically  signed  on:  Jul 22 2017    3:16AM.\par \par

## 2019-01-21 NOTE — CURRENT MEDS
[Medication and Allergies Reconciled] : medication and allergies reconciled [High Risk Medications Reviewed and Reconciled (Beers Criteria)] : high risk medications reviewed and reconciled [Reviewed patient reported medication adherence from Comprehensive Assessment] : Reviewed patient reported medication adherence from comprehensive assessment [Compliant with medications] : Patient is compliant with medications [de-identified] : medications managed by daughter

## 2019-02-04 PROBLEM — Z92.82 RECEIVED INTRAVENOUS TISSUE PLASMINOGEN ACTIVATOR (TPA) IN EMERGENCY DEPARTMENT: Status: RESOLVED | Noted: 2017-11-13 | Resolved: 2019-02-04

## 2019-03-01 ENCOUNTER — APPOINTMENT (OUTPATIENT)
Dept: HOME HEALTH SERVICES | Facility: HOME HEALTH | Age: 84
End: 2019-03-01

## 2019-04-23 ENCOUNTER — APPOINTMENT (OUTPATIENT)
Dept: HOME HEALTH SERVICES | Facility: HOME HEALTH | Age: 84
End: 2019-04-23
Payer: MEDICARE

## 2019-04-23 VITALS
TEMPERATURE: 97.8 F | OXYGEN SATURATION: 96 % | DIASTOLIC BLOOD PRESSURE: 60 MMHG | RESPIRATION RATE: 14 BRPM | HEART RATE: 87 BPM | SYSTOLIC BLOOD PRESSURE: 104 MMHG

## 2019-04-23 PROCEDURE — 99349 HOME/RES VST EST MOD MDM 40: CPT

## 2019-04-23 RX ORDER — LORAZEPAM 0.5 MG/1
0.5 TABLET ORAL
Qty: 120 | Refills: 0 | Status: DISCONTINUED | COMMUNITY
Start: 2018-04-03 | End: 2019-04-23

## 2019-04-23 NOTE — LETTER HEADER
[Care Plan managed/Care coordinated by: ___] : Care plan managed/Care coordinated by: [unfilled] [Care Plan reviewed every ___ weeks] : Care plan reviewed every [unfilled] weeks [Care Plan reviewed and provided to patient/caregiver] : Care plan reviewed and provided to patient/caregiver [Patient/Caregiver agrees to have other providers send summary of their care to this office] : Patient/caregiver agrees to have other providers send summary of their care to this office

## 2019-04-30 NOTE — COUNSELING
[Underweight (BMI < 18.5)] : Underweight (BMI < 18.5) [Non - Smoker] : non-smoker [Medical alert] : medical alert [Not Indicated] : not indicated [Minimize unnecessary interventions] : minimize unnecessary interventions [Decrease hospital use] : decrease hospital use [Maintain functional ability] : maintain functional ability [Discussed disease trajectory with patient/caregiver] : discussed disease trajectory with patient/caregiver [Likely to achieve goals/desired outcomes] : likely to achieve goals/desired outcomes [Patient/Caregiver has ___ understanding of disease process] : patient/caregiver has [unfilled] understanding of disease process [Completed DNR] : completed DNR [DNR] : Code Status: DNR [Limited] : Treatment Guidelines: Limited [Completed Medical Orders for Life-Sustaining Treatment] : completed medical orders for life-sustaining treatment [DNI] : Intubation: DNI [_____] : HCP: [unfilled] [[facility] Date: _____] [facility]/last verification date: [unfilled] [FreeTextEntry4] : comfort measures

## 2019-04-30 NOTE — REVIEW OF SYSTEMS
[Fatigue] : fatigue [Constipation] : constipation [Incontinence] : incontinence [Muscle Weakness] : muscle weakness [Confusion] : confusion [Memory Loss] : memory loss [Negative] : Psychiatric [FreeTextEntry7] : incontinence [de-identified] : as noted in HPI

## 2019-04-30 NOTE — REASON FOR VISIT
[Follow-Up] : a follow-up visit [Pre-Visit Preparation] : pre-visit preparation was done [Family Member] : family member [Intercurrent Specialty/Sub-specialty Visits] : the patient has no intercurrent specialty/sub-specialty visits [FreeTextEntry2] : chart review

## 2019-04-30 NOTE — CURRENT MEDS
[Medication and Allergies Reconciled] : medication and allergies reconciled [Reviewed patient reported medication adherence from Comprehensive Assessment] : Reviewed patient reported medication adherence from comprehensive assessment [Compliant with medications] : Patient is compliant with medications [de-identified] : medications managed by daughter

## 2019-04-30 NOTE — PHYSICAL EXAM
[No Acute Distress] : no acute distress [Normal Sclera/Conjunctiva] : normal sclera/conjunctiva [Normal Outer Ear/Nose] : the ears and nose were normal in appearance [Normal Oropharynx] : the oropharynx was normal [No Respiratory Distress] : no respiratory distress [No JVD] : no jugular venous distention [Clear to Auscultation] : lungs were clear to auscultation bilaterally [No Accessory Muscle Use] : no accessory muscle use [Normal Rate] : heart rate was normal  [Regular Rhythm] : with a regular rhythm [Normal S1, S2] : normal S1 and S2 [No Murmurs] : no murmurs heard [No LE Varicosities] : there were no varicosital changes in the lower extremities [Normal Appearance] : normal in appearance [No Edema] : there was no peripheral edema [Soft] : abdomen soft [Normal Bowel Sounds] : normal bowel sounds [Non Tender] : non-tender [Not Distended] : not distended [No HSM] : no hepato-splenomegaly [No Hernias] : no hernia was discovered [Normal Anterior Cervical Nodes] : no anterior cervical lymphadenopathy [No Masses] : no abdominal mass palpated [No Rash] : no rash [Kyphosis] :  kyphosis present [No Skin Lesions] : no skin lesions [Normal Supraclavicular Nodes] : no supraclavicular lymphadenopathy [de-identified] : awake during visit, not able to verbalize; frail w/  temporal wasting [de-identified] : upper and lower extremity muscle wasting  [de-identified] : unable to assess [de-identified] : unable to assess due to advanced dementia

## 2019-05-17 ENCOUNTER — APPOINTMENT (OUTPATIENT)
Dept: HOME HEALTH SERVICES | Facility: HOME HEALTH | Age: 84
End: 2019-05-17

## 2019-08-09 ENCOUNTER — APPOINTMENT (OUTPATIENT)
Dept: HOME HEALTH SERVICES | Facility: HOME HEALTH | Age: 84
End: 2019-08-09

## 2019-08-27 ENCOUNTER — APPOINTMENT (OUTPATIENT)
Dept: HOME HEALTH SERVICES | Facility: HOME HEALTH | Age: 84
End: 2019-08-27
Payer: MEDICARE

## 2019-08-27 VITALS
SYSTOLIC BLOOD PRESSURE: 120 MMHG | OXYGEN SATURATION: 98 % | DIASTOLIC BLOOD PRESSURE: 70 MMHG | HEART RATE: 55 BPM | TEMPERATURE: 97.8 F | RESPIRATION RATE: 14 BRPM

## 2019-08-27 PROCEDURE — 99349 HOME/RES VST EST MOD MDM 40: CPT

## 2019-08-27 NOTE — HISTORY OF PRESENT ILLNESS
[Family Member] : family member [FreeTextEntry1] : Dementia [FreeTextEntry2] : PMH: Dementia, Dysphagia, HTN (off medications), Constipation, Meningioma, Aortic calcification,  S/P CVA with TPA administration (2014). HPI/ROS with HHA\par \par In the interval, patient had routine conversation with care manager. \par \par Pt alert to name, with no c/o, very peasant.  Patient non-verbal, seen laying in bed. \par \par Daughter has plans for patient to see oral surgeon some time next week for removal of rotted teeth.  Unsure what kind of sedation patient will require \par \par Dementia- no longer giving patient Ativan.  Behavior has not been difficult.  \par appetite good- has difficulty swallowing- thickens liquids. no water, too thin. tolerates soft diet, weight stable- pt frail; incontinent of bowel and bladder- using prunes daily, has BM every three days- has not had BM in 3 days; per daughter  pt had hyperbaric oxygen therapy for dementia in 2013 "which saved her brain"\par Sleep - sleeps in hospital bed with side rails raised so patient cannot fall out of bed. \par

## 2019-08-27 NOTE — HEALTH RISK ASSESSMENT
[HRA Reviewed] : Health risk assessment reviewed [Full assistance needed] : managing finances [No falls in past year] : Patient reported no falls in the past year

## 2019-08-27 NOTE — PHYSICAL EXAM
[No Acute Distress] : no acute distress [Normal Sclera/Conjunctiva] : normal sclera/conjunctiva [Normal Outer Ear/Nose] : the ears and nose were normal in appearance [Normal Oropharynx] : the oropharynx was normal [No JVD] : no jugular venous distention [No Respiratory Distress] : no respiratory distress [Clear to Auscultation] : lungs were clear to auscultation bilaterally [No Accessory Muscle Use] : no accessory muscle use [Normal Rate] : heart rate was normal  [Regular Rhythm] : with a regular rhythm [Normal S1, S2] : normal S1 and S2 [No Murmurs] : no murmurs heard [No LE Varicosities] : there were no varicosital changes in the lower extremities [No Edema] : there was no peripheral edema [Normal Appearance] : normal in appearance [Normal Bowel Sounds] : normal bowel sounds [Non Tender] : non-tender [Soft] : abdomen soft [Not Distended] : not distended [No HSM] : no hepato-splenomegaly [No Masses] : no abdominal mass palpated [No Hernias] : no hernia was discovered [Normal Supraclavicular Nodes] : no supraclavicular lymphadenopathy [Normal Anterior Cervical Nodes] : no anterior cervical lymphadenopathy [Kyphosis] :  kyphosis present [No Rash] : no rash [No Skin Lesions] : no skin lesions [de-identified] : awake during visit, not able to verbalize; frail w/  temporal wasting [de-identified] : unable to assess [de-identified] : unable to assess due to advanced dementia  [de-identified] : upper and lower extremity muscle wasting

## 2019-08-27 NOTE — REASON FOR VISIT
[Follow-Up] : a follow-up visit [Family Member] : family member [Pre-Visit Preparation] : pre-visit preparation was done [Intercurrent Specialty/Sub-specialty Visits] : the patient has no intercurrent specialty/sub-specialty visits [FreeTextEntry2] : chart review

## 2019-08-27 NOTE — REVIEW OF SYSTEMS
[Fatigue] : fatigue [Constipation] : constipation [Incontinence] : incontinence [Muscle Weakness] : muscle weakness [Confusion] : confusion [Memory Loss] : memory loss [Negative] : Heme/Lymph [Joint Pain] : no joint pain [Joint Stiffness] : no joint stiffness [Joint Swelling] : no joint swelling [Muscle Pain] : no muscle pain [FreeTextEntry7] : incontinence [Back Pain] : no back pain [de-identified] : as noted in HPI

## 2019-08-27 NOTE — COUNSELING
[Decrease hospital use] : decrease hospital use [Minimize unnecessary interventions] : minimize unnecessary interventions [Maintain functional ability] : maintain functional ability [Discussed disease trajectory with patient/caregiver] : discussed disease trajectory with patient/caregiver [Likely to achieve goals/desired outcomes] : likely to achieve goals/desired outcomes [Patient/Caregiver has ___ understanding of disease process] : patient/caregiver has [unfilled] understanding of disease process [[facility]: _____[facility]last verification date: [unfilled] [_____] : HCP: [unfilled] [Underweight (BMI < 18.5)] : Underweight (BMI < 18.5) [Medical alert] : medical alert [Not Indicated] : not indicated [Underweight - ( BMI  <18.5 )] : underweight - ( BMI  <18.5 ) [Continue diet as tolerated] : continue diet as tolerated based on goals of care [Non - Smoker] : non-smoker [Remove clutter and unsafe carpeting to avoid falls] : remove clutter and unsafe carpeting to avoid falls [___] : [unfilled] [] : diabetic screening [FreeTextEntry4] : comfort measures

## 2019-08-30 LAB
ALBUMIN SERPL ELPH-MCNC: 3.8 G/DL
ALP BLD-CCNC: 87 U/L
ALT SERPL-CCNC: 12 U/L
ANION GAP SERPL CALC-SCNC: 12 MMOL/L
AST SERPL-CCNC: 17 U/L
BASOPHILS # BLD AUTO: 0.03 K/UL
BASOPHILS NFR BLD AUTO: 0.5 %
BILIRUB SERPL-MCNC: 0.4 MG/DL
BUN SERPL-MCNC: 18 MG/DL
CALCIUM SERPL-MCNC: 9.1 MG/DL
CHLORIDE SERPL-SCNC: 105 MMOL/L
CO2 SERPL-SCNC: 27 MMOL/L
CREAT SERPL-MCNC: 1.03 MG/DL
EOSINOPHIL # BLD AUTO: 0.2 K/UL
EOSINOPHIL NFR BLD AUTO: 3.2 %
HCT VFR BLD CALC: 39.4 %
HGB BLD-MCNC: 12.2 G/DL
IMM GRANULOCYTES NFR BLD AUTO: 0.3 %
LYMPHOCYTES # BLD AUTO: 1.65 K/UL
LYMPHOCYTES NFR BLD AUTO: 26.2 %
MAN DIFF?: NORMAL
MCHC RBC-ENTMCNC: 27.2 PG
MCHC RBC-ENTMCNC: 31 GM/DL
MCV RBC AUTO: 87.8 FL
MONOCYTES # BLD AUTO: 0.51 K/UL
MONOCYTES NFR BLD AUTO: 8.1 %
NEUTROPHILS # BLD AUTO: 3.89 K/UL
NEUTROPHILS NFR BLD AUTO: 61.7 %
PLATELET # BLD AUTO: 158 K/UL
POTASSIUM SERPL-SCNC: 4.4 MMOL/L
PREALB SERPL NEPH-MCNC: 23 MG/DL
PROT SERPL-MCNC: 6 G/DL
RBC # BLD: 4.49 M/UL
RBC # FLD: 13.3 %
SODIUM SERPL-SCNC: 144 MMOL/L
WBC # FLD AUTO: 6.3 K/UL

## 2019-10-04 ENCOUNTER — APPOINTMENT (OUTPATIENT)
Dept: HOME HEALTH SERVICES | Facility: HOME HEALTH | Age: 84
End: 2019-10-04

## 2019-12-18 ENCOUNTER — APPOINTMENT (OUTPATIENT)
Dept: HOME HEALTH SERVICES | Facility: HOME HEALTH | Age: 84
End: 2019-12-18
Payer: MEDICARE

## 2019-12-18 VITALS
TEMPERATURE: 98.3 F | RESPIRATION RATE: 14 BRPM | DIASTOLIC BLOOD PRESSURE: 60 MMHG | OXYGEN SATURATION: 96 % | SYSTOLIC BLOOD PRESSURE: 130 MMHG | HEART RATE: 57 BPM

## 2019-12-18 PROCEDURE — 90662 IIV NO PRSV INCREASED AG IM: CPT

## 2019-12-18 PROCEDURE — G0008: CPT

## 2019-12-18 PROCEDURE — 99349 HOME/RES VST EST MOD MDM 40: CPT | Mod: 25

## 2019-12-18 NOTE — COUNSELING
[Underweight - ( BMI  <18.5 )] : underweight - ( BMI  <18.5 ) [Continue diet as tolerated] : continue diet as tolerated based on goals of care [Remove clutter and unsafe carpeting to avoid falls] : remove clutter and unsafe carpeting to avoid falls [___] : [unfilled] [] : diabetic screening [Decrease hospital use] : decrease hospital use [Minimize unnecessary interventions] : minimize unnecessary interventions [Discussed disease trajectory with patient/caregiver] : discussed disease trajectory with patient/caregiver [Maintain functional ability] : maintain functional ability [Likely to achieve goals/desired outcomes] : likely to achieve goals/desired outcomes [Patient/Caregiver has ___ understanding of disease process] : patient/caregiver has [unfilled] understanding of disease process [[facility] Date: _____] [facility] Com[facility]/last verification date: [unfilled] [Underweight (BMI < 18.5)] : Underweight (BMI < 18.5) [_____] : HCP: [unfilled] [Non - Smoker] : non-smoker [Medical alert] : medical alert [Not Indicated] : not indicated [FreeTextEntry4] : comfort measures

## 2019-12-18 NOTE — HISTORY OF PRESENT ILLNESS
[Family Member] : family member [FreeTextEntry1] : Dementia [FreeTextEntry2] : PMH: Dementia, Dysphagia, HTN (off medications), Constipation, Meningioma, Aortic calcification,  S/P CVA with TPA administration (2014). HPI/ROS with HHA\par \par In the interval, patient had routine conversation with care manager and routine labs drawn. As per daughter, patient has been more lethargic with a decrease in appetite x 3 days - may be due to rotted teeth or cold-like symptoms. At my last visit, did routine labs to assess readiness for Hospice - prealbumin wnl and nothing acute seen on labs which would qualify for Hospice. \par \par  Patient non-verbal, seen laying in bed. \par \par Dementia- no longer giving patient Ativan.  Behavior has not been difficult.  \par appetite good- has difficulty swallowing- thickens liquids. no water, too thin. tolerates soft diet, weight stable- pt frail; incontinent of bowel and bladder- using prunes daily, has BM every three days per daughter  pt had hyperbaric oxygen therapy for dementia in 2013 "which saved her brain"\par Sleep - patient is sleeping more, sleeping pretty much all day. \par

## 2019-12-18 NOTE — REVIEW OF SYSTEMS
[Fatigue] : fatigue [Constipation] : constipation [Incontinence] : incontinence [Muscle Weakness] : muscle weakness [Memory Loss] : memory loss [Confusion] : confusion [Negative] : Heme/Lymph [Joint Pain] : no joint pain [Joint Stiffness] : no joint stiffness [Joint Swelling] : no joint swelling [Back Pain] : no back pain [Muscle Pain] : no muscle pain [FreeTextEntry7] : incontinence [de-identified] : as noted in HPI

## 2019-12-18 NOTE — PHYSICAL EXAM
[No Acute Distress] : no acute distress [Normal Sclera/Conjunctiva] : normal sclera/conjunctiva [Normal Oropharynx] : the oropharynx was normal [Normal Outer Ear/Nose] : the ears and nose were normal in appearance [No JVD] : no jugular venous distention [Clear to Auscultation] : lungs were clear to auscultation bilaterally [No Respiratory Distress] : no respiratory distress [Normal Rate] : heart rate was normal  [No Accessory Muscle Use] : no accessory muscle use [Regular Rhythm] : with a regular rhythm [Normal S1, S2] : normal S1 and S2 [No Murmurs] : no murmurs heard [No LE Varicosities] : there were no varicosital changes in the lower extremities [Normal Appearance] : normal in appearance [No Edema] : there was no peripheral edema [Normal Bowel Sounds] : normal bowel sounds [Soft] : abdomen soft [Non Tender] : non-tender [Not Distended] : not distended [No HSM] : no hepato-splenomegaly [No Masses] : no abdominal mass palpated [Normal Supraclavicular Nodes] : no supraclavicular lymphadenopathy [No Hernias] : no hernia was discovered [Kyphosis] :  kyphosis present [Normal Anterior Cervical Nodes] : no anterior cervical lymphadenopathy [No Skin Lesions] : no skin lesions [No Rash] : no rash [de-identified] : awake during visit, not able to verbalize; frail w/  temporal wasting [de-identified] : unable to assess [de-identified] : upper and lower extremity muscle wasting  [de-identified] : unable to assess due to advanced dementia

## 2019-12-18 NOTE — CURRENT MEDS
[High Risk Medications Reviewed and Reconciled (Beers Criteria)] : high risk medications reviewed and reconciled [Medication and Allergies Reconciled] : medication and allergies reconciled [Adherent to medications] : Patient is adherent to medications as prescribed

## 2019-12-18 NOTE — HEALTH RISK ASSESSMENT
[Full assistance needed] : managing medications [No falls in past year] : Patient reported no falls in the past year [HRA Reviewed] : Health risk assessments reviewed

## 2019-12-23 LAB
ALBUMIN SERPL ELPH-MCNC: 3.9 G/DL
ALP BLD-CCNC: 82 U/L
ALT SERPL-CCNC: 18 U/L
ANION GAP SERPL CALC-SCNC: 13 MMOL/L
AST SERPL-CCNC: 17 U/L
BILIRUB SERPL-MCNC: 0.4 MG/DL
BUN SERPL-MCNC: 32 MG/DL
CALCIUM SERPL-MCNC: 9.4 MG/DL
CHLORIDE SERPL-SCNC: 103 MMOL/L
CO2 SERPL-SCNC: 28 MMOL/L
CREAT SERPL-MCNC: 1.15 MG/DL
POTASSIUM SERPL-SCNC: 5.1 MMOL/L
PREALB SERPL NEPH-MCNC: 22 MG/DL
PROT SERPL-MCNC: 6.2 G/DL
SODIUM SERPL-SCNC: 144 MMOL/L

## 2020-01-27 ENCOUNTER — APPOINTMENT (OUTPATIENT)
Dept: HOME HEALTH SERVICES | Facility: HOME HEALTH | Age: 85
End: 2020-01-27

## 2020-03-11 ENCOUNTER — APPOINTMENT (OUTPATIENT)
Dept: HOME HEALTH SERVICES | Facility: HOME HEALTH | Age: 85
End: 2020-03-11

## 2020-03-18 ENCOUNTER — APPOINTMENT (OUTPATIENT)
Dept: HOME HEALTH SERVICES | Facility: HOME HEALTH | Age: 85
End: 2020-03-18

## 2020-03-18 DIAGNOSIS — Z92.29 PERSONAL HISTORY OF OTHER DRUG THERAPY: ICD-10-CM

## 2020-03-18 DIAGNOSIS — Z71.89 OTHER SPECIFIED COUNSELING: ICD-10-CM

## 2020-03-18 RX ORDER — AMOXICILLIN AND CLAVULANATE POTASSIUM 875; 125 MG/1; MG/1
875-125 TABLET, COATED ORAL
Qty: 14 | Refills: 0 | Status: DISCONTINUED | COMMUNITY
Start: 2020-02-21 | End: 2020-03-18

## 2020-03-18 RX ORDER — STARCH
POWDER (GRAM) ORAL
Qty: 1 | Refills: 5 | Status: DISCONTINUED | COMMUNITY
Start: 2018-04-16 | End: 2020-03-18

## 2020-06-04 ENCOUNTER — APPOINTMENT (OUTPATIENT)
Dept: HOME HEALTH SERVICES | Facility: HOME HEALTH | Age: 85
End: 2020-06-04
Payer: MEDICARE

## 2020-06-04 PROCEDURE — 99349 HOME/RES VST EST MOD MDM 40: CPT | Mod: 95

## 2020-06-04 NOTE — HEALTH RISK ASSESSMENT
[No falls in past year] : Patient reported no falls in the past year [Full assistance needed] : managing finances [HRA Reviewed] : Health risk assessments reviewed

## 2020-06-05 NOTE — REVIEW OF SYSTEMS
[Incontinence] : incontinence [Constipation] : constipation [Muscle Weakness] : muscle weakness [Confusion] : confusion [Memory Loss] : memory loss [Joint Pain] : no joint pain [Joint Stiffness] : no joint stiffness [Joint Swelling] : no joint swelling [Muscle Pain] : no muscle pain [Back Pain] : no back pain [FreeTextEntry7] : incontinence [Negative] : Constitutional [de-identified] : as noted in HPI [FreeTextEntry1] : Rest of ROS as per HPI or negative.

## 2020-06-05 NOTE — HISTORY OF PRESENT ILLNESS
[Family Member] : family member [FreeTextEntry1] : Dementia [FreeTextEntry2] : DEVAN GASCA is being seen for a visit provided via telehealth real-time audio visual technology.\par DEVAN GASCA was located at their home, [address] [place] [address]\[place] [address], at the time of the visit.\par [facility] clinician, GARY BANG, was located remotely at their home in [place] at the time of the visit. \par The patient, DEVAN GASCA, and [facility] clinician, GARY BANG, participated in the telehealth encounter.\par Other participants included: dtr, son-in-law \par DEVAN GASCA (Mar 13 1935) or his/her representative consents to the use of telehealth. All questions related to telehealth answered.\par  \par \par 86 yo F PMH: Dementia, Dysphagia, HTN (off medications), Constipation, Meningioma, Aortic calcification,  S/P CVA with TPA administration (2014). HPI/ROS with HHA\par \par Dtr requests the following: \par kash-chair, roho cushion, mattress for hospital bed. \par \par  Patient non-verbal, seen sitting in a chair. \par \par Dementia-  No behavior issues \par appetite good- has difficulty swallowing- thickens liquids.. tolerates soft diet, weight stable\par - pt incontinent of bowel and bladder- using prunes daily, has BM every three days per daughter  pt had hyperbaric oxygen therapy for dementia in 2013 "which saved her brain"\par Sleep - Sleeps well at night. Sometimes takes daytime nap. \par Constipation: Having regular BMs on prune juice and Miralax

## 2020-06-05 NOTE — PHYSICAL EXAM
[No Acute Distress] : no acute distress [Normal Sclera/Conjunctiva] : normal sclera/conjunctiva [Normal Outer Ear/Nose] : the ears and nose were normal in appearance [No JVD] : no jugular venous distention [No Respiratory Distress] : no respiratory distress [No Accessory Muscle Use] : no accessory muscle use [No Edema] : there was no peripheral edema [Normal Appearance] : normal in appearance [Normal Bowel Sounds] : normal bowel sounds [Non Tender] : non-tender [Not Distended] : not distended [Soft] : abdomen soft [No HSM] : no hepato-splenomegaly [No Hernias] : no hernia was discovered [No Masses] : no abdominal mass palpated [No Rash] : no rash [No Skin Lesions] : no skin lesions [de-identified] : awake during visit, not able to verbalize; frail w/  temporal wasting [de-identified] : upper and lower extremity muscle wasting  [de-identified] : unable to assess [de-identified] : unable to assess due to advanced dementia

## 2020-06-05 NOTE — ADDENDUM
[FreeTextEntry1] : Due to advanced dementia, patient is unable to maintain an upright position unsupported. Wheelchair is no longer adequate as patient's condition has deteriorated. Needs a kash-chair to allow her to sit and eat. has a caregiver willing and able to assist with geriatric chair. Patient's home provides adequate access for use of chair in the home. \par \par Patient cannot independently make changes in body position significant enough to alleviate pressure. Requires gel cushion.

## 2020-06-05 NOTE — REASON FOR VISIT
[Follow-Up] : a follow-up visit [Family Member] : family member [Pre-Visit Preparation] : pre-visit preparation was done [Intercurrent Specialty/Sub-specialty Visits] : the patient has no intercurrent specialty/sub-specialty visits [FreeTextEntry1] : dementia  [FreeTextEntry2] : chart review

## 2020-06-05 NOTE — COUNSELING
[Underweight - ( BMI  <18.5 )] : underweight - ( BMI  <18.5 ) [Continue diet as tolerated] : continue diet as tolerated based on goals of care [Remove clutter and unsafe carpeting to avoid falls] : remove clutter and unsafe carpeting to avoid falls [___] : [unfilled] [] : abdominal aortic ultrasound [Decrease hospital use] : decrease hospital use [Maintain functional ability] : maintain functional ability [Minimize unnecessary interventions] : minimize unnecessary interventions [Likely to achieve goals/desired outcomes] : likely to achieve goals/desired outcomes [Discussed disease trajectory with patient/caregiver] : discussed disease trajectory with patient/caregiver [Patient/Caregiver has ___ understanding of disease process] : patient/caregiver has [unfilled] understanding of disease process [Last Verification Date: _____] [facility]/last verification date: [unfilled] [_____] : HCP: [unfilled] [Underweight (BMI < 18.5)] : Underweight (BMI < 18.5) [Medical alert] : medical alert [Non - Smoker] : non-smoker [Not Indicated] : not indicated [FreeTextEntry4] : comfort measures

## 2020-09-04 ENCOUNTER — APPOINTMENT (OUTPATIENT)
Dept: HOME HEALTH SERVICES | Facility: HOME HEALTH | Age: 85
End: 2020-09-04
Payer: MEDICARE

## 2020-09-04 PROCEDURE — 99349 HOME/RES VST EST MOD MDM 40: CPT | Mod: 95

## 2020-09-04 NOTE — HISTORY OF PRESENT ILLNESS
[Family Member] : family member [FreeTextEntry1] : Dementia [FreeTextEntry2] : DEVAN GASCA is being seen for a visit provided via telehealth via [employer]. This visit was first attempted using [employer] telehealth real-time audio visual technology, but was unable to be completed.\par DEVAN GASCA was located at their home, [address] [place] [address]\[place] [address], at the time of the visit.\par [facility] clinician, MICHAEL ALVAREZ, was located remotely at their home in [place] at the time of the visit. \par The patient, DEVAN GASCA, and [facility] clinician, MICHAEL ALVAREZ, participated in the telehealth encounter.\par Other participants included: patient's daughter, Viviana. \par DEVAN GASCA (Mar 13 1935) or his/her representative consents to the use of telehealth. All questions related to telehealth answered.\par \par PMH: Dementia, Dysphagia, HTN (off medications), Constipation, Meningioma, Aortic calcification,  S/P CVA with TPA administration (2014). HPI/ROS with HHA\par \par  Patient non-verbal, seen sitting in a chair. \par \par Dementia-  stable.  Daughter states she is more engaged.. enjoys watching T.V.  Is nonverbal, but - she does respond ye or no (if she wants to lay down).  Daughter reports she does have lucid moments.  \par Appetite:  eating well, pureed food.  Weight has been stable. Daughter will ground up the family dinner, daughter will give her foods that she knows patient likes.  Protein drink everyday.  Thicken liquids -  no coughing.  \par Skin:  no breakdown, no rashes.  Does get OOB daily into kash chair and use of roho cushion. \par BM: prunes daily as well as MiraLAX.  BM everyday.  \par Sleep - Sleeps well at night. Sometimes takes daytime nap. \par Skin:  no breakdown, rashes.

## 2020-09-04 NOTE — PHYSICAL EXAM
[No Acute Distress] : no acute distress [Normal Sclera/Conjunctiva] : normal sclera/conjunctiva [No JVD] : no jugular venous distention [Normal Outer Ear/Nose] : the ears and nose were normal in appearance [No Respiratory Distress] : no respiratory distress [No Accessory Muscle Use] : no accessory muscle use [Normal Appearance] : normal in appearance [No Edema] : there was no peripheral edema [Normal Bowel Sounds] : normal bowel sounds [Non Tender] : non-tender [Soft] : abdomen soft [Not Distended] : not distended [No HSM] : no hepato-splenomegaly [No Masses] : no abdominal mass palpated [No Hernias] : no hernia was discovered [No Rash] : no rash [No Skin Lesions] : no skin lesions [de-identified] : awake during visit, not able to verbalize; frail w/  temporal wasting [de-identified] : upper and lower extremity muscle wasting  [de-identified] : unable to assess [de-identified] : unable to assess due to advanced dementia

## 2020-09-04 NOTE — REVIEW OF SYSTEMS
[Constipation] : constipation [Incontinence] : incontinence [Muscle Weakness] : muscle weakness [Confusion] : confusion [Memory Loss] : memory loss [Negative] : Heme/Lymph [Joint Pain] : no joint pain [Muscle Pain] : no muscle pain [Joint Stiffness] : no joint stiffness [Joint Swelling] : no joint swelling [Back Pain] : no back pain [FreeTextEntry7] : incontinence [de-identified] : as noted in HPI [FreeTextEntry1] : Rest of ROS as per HPI or negative.

## 2020-09-04 NOTE — COUNSELING
[Underweight - ( BMI  <18.5 )] : underweight - ( BMI  <18.5 ) [Continue diet as tolerated] : continue diet as tolerated based on goals of care [Remove clutter and unsafe carpeting to avoid falls] : remove clutter and unsafe carpeting to avoid falls [___] : [unfilled] [] : diabetic screening [Minimize unnecessary interventions] : minimize unnecessary interventions [Decrease hospital use] : decrease hospital use [Maintain functional ability] : maintain functional ability [Likely to achieve goals/desired outcomes] : likely to achieve goals/desired outcomes [Discussed disease trajectory with patient/caregiver] : discussed disease trajectory with patient/caregiver [Patient/Caregiver has ___ understanding of disease process] : patient/caregiver has [unfilled] understanding of disease process [Underweight (BMI < 18.5)] : Underweight (BMI < 18.5) [Non - Smoker] : non-smoker [Not Indicated] : not indicated [Medical alert] : medical alert [FreeTextEntry4] : comfort measures

## 2020-09-04 NOTE — HEALTH RISK ASSESSMENT
[Full assistance needed] : managing finances [No falls in past year] : Patient reported no falls in the past year [HRA Reviewed] : Health risk assessments reviewed

## 2020-10-07 ENCOUNTER — APPOINTMENT (OUTPATIENT)
Dept: HOME HEALTH SERVICES | Facility: HOME HEALTH | Age: 85
End: 2020-10-07
Payer: MEDICARE

## 2020-10-07 ENCOUNTER — APPOINTMENT (OUTPATIENT)
Age: 85
End: 2020-10-07

## 2020-10-07 VITALS
RESPIRATION RATE: 14 BRPM | HEART RATE: 86 BPM | TEMPERATURE: 97.9 F | DIASTOLIC BLOOD PRESSURE: 70 MMHG | SYSTOLIC BLOOD PRESSURE: 150 MMHG

## 2020-10-07 PROCEDURE — 99349 HOME/RES VST EST MOD MDM 40: CPT | Mod: 25

## 2020-10-07 PROCEDURE — G0008: CPT

## 2020-10-07 PROCEDURE — 90662 IIV NO PRSV INCREASED AG IM: CPT

## 2020-10-19 NOTE — HISTORY OF PRESENT ILLNESS
[Family Member] : family member [FreeTextEntry1] : Dementia [FreeTextEntry2] : \par \par PMH: Dementia, Dysphagia, HTN (off medications), Constipation, Meningioma, Aortic calcification,  S/P CVA with TPA administration (2014). HPI/ROS with HHA.  Patient being seen today for acute visit for flu shot. \par \par  Patient non-verbal, seen sitting in a chair. \par \par Dementia-  stable.  Daughter states she is more engaged.. enjoys watching T.V.  Is nonverbal, but - she does respond ye or no (if she wants to lay down).  Daughter reports she does have lucid moments.  \par Appetite:  eating well, pureed food.  Weight has been stable. Daughter will ground up the family dinner, daughter will give her foods that she knows patient likes.  Protein drink everyday.  Thicken liquids -  no coughing.  \par Skin:  no breakdown, no rashes.  Does get OOB daily into kash chair and use of roho cushion. \par BM: prunes daily as well as MiraLAX.  BM everyday.  \par Sleep - Sleeps well at night. Sometimes takes daytime nap. \par Skin:  no breakdown, rashes.

## 2020-10-19 NOTE — PHYSICAL EXAM
[No Acute Distress] : no acute distress [Normal Sclera/Conjunctiva] : normal sclera/conjunctiva [Normal Outer Ear/Nose] : the ears and nose were normal in appearance [No JVD] : no jugular venous distention [No Respiratory Distress] : no respiratory distress [No Accessory Muscle Use] : no accessory muscle use [No Edema] : there was no peripheral edema [Normal Appearance] : normal in appearance [Normal Bowel Sounds] : normal bowel sounds [Non Tender] : non-tender [Soft] : abdomen soft [Not Distended] : not distended [No HSM] : no hepato-splenomegaly [No Masses] : no abdominal mass palpated [No Hernias] : no hernia was discovered [No Rash] : no rash [No Skin Lesions] : no skin lesions [Normal Rate] : heart rate was normal  [Pedal Pulses Present] : the pedal pulses are present [de-identified] : awake during visit, not able to verbalize; frail w/  temporal wasting [de-identified] : upper and lower extremity muscle wasting  [de-identified] : unable to assess [de-identified] : unable to assess due to advanced dementia

## 2020-10-19 NOTE — REASON FOR VISIT
[Acute] : an acute visit [Family Member] : family member [Pre-Visit Preparation] : pre-visit preparation was done [Intercurrent Specialty/Sub-specialty Visits] : the patient has no intercurrent specialty/sub-specialty visits [FreeTextEntry2] : chart review

## 2020-11-17 ENCOUNTER — APPOINTMENT (OUTPATIENT)
Dept: HOME HEALTH SERVICES | Facility: HOME HEALTH | Age: 85
End: 2020-11-17
Payer: MEDICARE

## 2020-11-17 PROCEDURE — 99349 HOME/RES VST EST MOD MDM 40: CPT | Mod: 95

## 2020-11-17 NOTE — PHYSICAL EXAM
[No Acute Distress] : no acute distress [Normal Sclera/Conjunctiva] : normal sclera/conjunctiva [Normal Outer Ear/Nose] : the ears and nose were normal in appearance [No JVD] : no jugular venous distention [No Respiratory Distress] : no respiratory distress [No Accessory Muscle Use] : no accessory muscle use [No Edema] : there was no peripheral edema [Normal Appearance] : normal in appearance [Normal Bowel Sounds] : normal bowel sounds [Non Tender] : non-tender [Soft] : abdomen soft [Not Distended] : not distended [No HSM] : no hepato-splenomegaly [No Masses] : no abdominal mass palpated [No Hernias] : no hernia was discovered [No Rash] : no rash [No Skin Lesions] : no skin lesions [de-identified] : awake during visit, not able to verbalize; frail w/  temporal wasting [de-identified] : upper and lower extremity muscle wasting  [de-identified] : unable to assess [de-identified] : unable to assess due to advanced dementia

## 2020-11-17 NOTE — HISTORY OF PRESENT ILLNESS
[Family Member] : family member [FreeTextEntry1] : Dementia [FreeTextEntry2] : DEVAN GASCA is being seen for a visit provided via telehealth real-time audio visual technology.\par DEVAN GASCA was located at their home, [address] [place] [address]\[place] [address], at the time of the visit.\par [facility] clinician, MICHAEL ALVAREZ, was located remotely at their home in [place] at the time of the visit. \par The patient, DEVAN GASCA, and [facility] clinician, MICHAEL ALVAREZ, participated in the telehealth encounter.\par Other participants included: patient's daughter, Viviana \par DEVAN GASCA (Mar 13 1935) or his/her representative consents to the use of telehealth. All questions related to telehealth answered.\par \par PMH: Dementia, Dysphagia, HTN (off medications), Constipation, Meningioma, Aortic calcification,  S/P CVA with TPA administration (2014). HPI/ROS with HHA\par \par interval events: acute visit for flu shot. \par \par  Patient non-verbal, seen sitting in a chair. \par \par Dementia-  stable.  Daughter states she is more engaged.. enjoys watching T.V.  Is nonverbal, but - she does respond ye or no (if she wants to lay down).  Daughter reports she does have lucid moments.  She is stimulated during the day - house is busy, full of people. \par Appetite:  eating well, pureed food.  Weight has been stable. Daughter will ground up the family dinner, daughter will give her foods that she knows patient likes.  Protein drink everyday.  Thicken liquids -  no coughing.  \par Skin:  no breakdown, no rashes.  Does get OOB daily into kash chair and use of roho cushion. \par BM: prunes daily as well as MiraLAX.  BM everyday.  \par Sleep - Sleeps well at night. Sometimes takes daytime nap. \par Skin:  no breakdown, rashes.

## 2020-11-17 NOTE — COUNSELING
[Underweight - ( BMI  <18.5 )] : underweight - ( BMI  <18.5 ) [Continue diet as tolerated] : continue diet as tolerated based on goals of care [Remove clutter and unsafe carpeting to avoid falls] : remove clutter and unsafe carpeting to avoid falls [___] : [unfilled] [] : diabetic screening [Decrease hospital use] : decrease hospital use [Minimize unnecessary interventions] : minimize unnecessary interventions [Maintain functional ability] : maintain functional ability [Discussed disease trajectory with patient/caregiver] : discussed disease trajectory with patient/caregiver [Likely to achieve goals/desired outcomes] : likely to achieve goals/desired outcomes [Patient/Caregiver has ___ understanding of disease process] : patient/caregiver has [unfilled] understanding of disease process [Underweight (BMI < 18.5)] : Underweight (BMI < 18.5) [Non - Smoker] : non-smoker [Medical alert] : medical alert [Not Indicated] : not indicated [FreeTextEntry4] : comfort measures

## 2020-11-17 NOTE — REVIEW OF SYSTEMS
[Constipation] : constipation [Incontinence] : incontinence [Muscle Weakness] : muscle weakness [Confusion] : confusion [Memory Loss] : memory loss [Negative] : Heme/Lymph [Joint Pain] : no joint pain [Joint Stiffness] : no joint stiffness [Joint Swelling] : no joint swelling [Muscle Pain] : no muscle pain [Back Pain] : no back pain [FreeTextEntry7] : incontinence [de-identified] : as noted in HPI [FreeTextEntry1] : Rest of ROS as per HPI or negative.

## 2020-12-02 NOTE — ED ADULT TRIAGE NOTE - TEMPERATURE IN CELSIUS (DEGREES C)
[facility]    Phone Message    May a detailed message be left on voicemail: yes     Reason for Call: Other: Patient called to make appt for hemorrhoids.  Per patient's request, he would like the call back expedited.  Thank you!     Action Taken: Message routed to:  [facility] ([facility]): Clinic Coord Surg [facility]    Travel Screening: Not Applicable                                                                         36.2

## 2020-12-09 NOTE — REVIEW OF SYSTEMS
[Constipation] : constipation [Incontinence] : incontinence [Muscle Weakness] : muscle weakness [Confusion] : confusion [Memory Loss] : memory loss [Negative] : Heme/Lymph [Joint Pain] : no joint pain [Joint Stiffness] : no joint stiffness [Joint Swelling] : no joint swelling [Muscle Pain] : no muscle pain [Back Pain] : no back pain [FreeTextEntry7] : incontinence full weight-bearing [de-identified] : as noted in HPI [FreeTextEntry1] : Rest of ROS as per HPI or negative.

## 2020-12-22 ENCOUNTER — APPOINTMENT (OUTPATIENT)
Age: 85
End: 2020-12-22

## 2021-02-23 ENCOUNTER — APPOINTMENT (OUTPATIENT)
Dept: HOME HEALTH SERVICES | Facility: HOME HEALTH | Age: 86
End: 2021-02-23
Payer: MEDICARE

## 2021-02-23 PROCEDURE — 99349 HOME/RES VST EST MOD MDM 40: CPT | Mod: 95

## 2021-02-23 NOTE — REVIEW OF SYSTEMS
[Constipation] : constipation [Incontinence] : incontinence [Muscle Weakness] : muscle weakness [Confusion] : confusion [Memory Loss] : memory loss [Negative] : Heme/Lymph [Joint Pain] : no joint pain [Joint Stiffness] : no joint stiffness [Joint Swelling] : no joint swelling [Muscle Pain] : no muscle pain [Back Pain] : no back pain [FreeTextEntry7] : incontinence [de-identified] : as noted in HPI [FreeTextEntry1] : Rest of ROS as per HPI or negative.

## 2021-02-23 NOTE — PHYSICAL EXAM
[No Acute Distress] : no acute distress [Normal Sclera/Conjunctiva] : normal sclera/conjunctiva [Normal Outer Ear/Nose] : the ears and nose were normal in appearance [No JVD] : no jugular venous distention [No Respiratory Distress] : no respiratory distress [No Accessory Muscle Use] : no accessory muscle use [No Edema] : there was no peripheral edema [Normal Appearance] : normal in appearance [Normal Bowel Sounds] : normal bowel sounds [Non Tender] : non-tender [Soft] : abdomen soft [Not Distended] : not distended [No HSM] : no hepato-splenomegaly [No Masses] : no abdominal mass palpated [No Hernias] : no hernia was discovered [No Rash] : no rash [No Skin Lesions] : no skin lesions [de-identified] : awake during visit, not able to verbalize; frail w/  temporal wasting [de-identified] : upper and lower extremity muscle wasting  [de-identified] : unable to assess [de-identified] : unable to assess due to advanced dementia

## 2021-02-23 NOTE — HISTORY OF PRESENT ILLNESS
[Family Member] : family member [FreeTextEntry1] : Dementia [FreeTextEntry2] : DEVAN GASCA is being seen for a visit provided via telehealth real-time audio visual technology.\par DEVAN GASCA was located at their home, [address] [place] [address]\[place] [address], at the time of the visit.\par [facility] clinician, MICHAEL ALVAREZ, was located remotely at their home in [place] at the time of the visit. \par The patient, DEVAN GASCA, and [facility] clinician, MICHAEL ALVAREZ, participated in the telehealth encounter.\par Other participants included: patient's daughter, Viviana \par DEVAN GASCA (Mar 13 1935) or his/her representative consents to the use of telehealth. All questions related to telehealth answered.\par \par PMH: Dementia, Dysphagia, HTN (off medications), Constipation, Meningioma, Aortic calcification,  S/P CVA with TPA administration (2014). HPI/ROS with HHA\par \par interval events: routine conversation with care manager. \par \par  Patient non-verbal, seen sitting in a chair. \par \par Dementia-  stable.  Daughter states she is more engaged.. enjoys watching T.V.  Is nonverbal, but - she does respond ye or no (if she wants to lay down).  Daughter reports she does have lucid moments.  She is stimulated during the day - house is busy, full of people. \par Appetite:  eating well, pureed food.  Weight has been stable. Daughter will ground up the family dinner, daughter will give her foods that she knows patient likes.  Protein drink everyday.  Thicken liquids -  no coughing.  \par Skin:  no breakdown, no rashes.  Does get OOB daily into kash chair and use of roho cushion. She is OOB about 7-10hours/day. \par BM: prunes daily as well as MiraLAX.  BM everyday.  \par Sleep - Sleeps well at night. Sometimes takes daytime nap. \par Skin:  no breakdown, rashes.

## 2021-02-23 NOTE — COUNSELING
[Underweight - ( BMI  <18.5 )] : underweight - ( BMI  <18.5 ) [Continue diet as tolerated] : continue diet as tolerated based on goals of care [Non - Smoker] : non-smoker [Remove clutter and unsafe carpeting to avoid falls] : remove clutter and unsafe carpeting to avoid falls [___] : [unfilled] [] : diabetic screening [Decrease hospital use] : decrease hospital use [Minimize unnecessary interventions] : minimize unnecessary interventions [Maintain functional ability] : maintain functional ability [Discussed disease trajectory with patient/caregiver] : discussed disease trajectory with patient/caregiver [Likely to achieve goals/desired outcomes] : likely to achieve goals/desired outcomes [Patient/Caregiver has ___ understanding of disease process] : patient/caregiver has [unfilled] understanding of disease process [DNR] : Code Status: DNR [Limited] : Treatment Guidelines: Limited [DNI] : Intubation: DNI [[facility]: _____] [facility]/last verification date: [unfilled] [_____] : HCP: [unfilled] [FreeTextEntry4] : comfort measures

## 2021-03-17 ENCOUNTER — NON-APPOINTMENT (OUTPATIENT)
Age: 86
End: 2021-03-17

## 2021-03-18 ENCOUNTER — APPOINTMENT (OUTPATIENT)
Dept: HOME HEALTH SERVICES | Facility: HOME HEALTH | Age: 86
End: 2021-03-18
Payer: MEDICARE

## 2021-03-18 VITALS — HEART RATE: 60 BPM | TEMPERATURE: 96.9 F | OXYGEN SATURATION: 95 %

## 2021-03-18 DIAGNOSIS — Z23 ENCOUNTER FOR IMMUNIZATION: ICD-10-CM

## 2021-03-18 PROCEDURE — 0031A: CPT

## 2021-03-22 PROBLEM — Z23 ENCOUNTER FOR ADMINISTRATION OF COVID-19 VACCINE: Status: ACTIVE | Noted: 2021-03-22

## 2021-05-01 NOTE — ED PROVIDER NOTE - OBJECTIVE STATEMENT
Problem: Adult Behavioral Health Plan of Care  Goal: Plan of Care Review  Outcome: Progressing  Flowsheets (Taken 5/1/2021 4667)  Plan of Care Reviewed With: patient  Patient Agreement with Plan of Care: agrees  Outcome Summary:  reviewed d/c planning with patient.  Patient is aggreeable to attend the [facility] emotional wellness program and meet with her  from [facility].     81 y/o w/f h/o advanced dementia, she developed acute mental change at 9am. She was assisted by her grandson and walked to the bathroom and afterwards she had a meal. At 9pm she was found unresponsive and facial droop. In the ER she is not verbal, moves all extremities and resisting the staff with good upper extremity strength.  PMD Dr Quijano.

## 2021-05-28 ENCOUNTER — APPOINTMENT (OUTPATIENT)
Dept: HOME HEALTH SERVICES | Facility: HOME HEALTH | Age: 86
End: 2021-05-28
Payer: MEDICARE

## 2021-05-28 VITALS
DIASTOLIC BLOOD PRESSURE: 80 MMHG | OXYGEN SATURATION: 96 % | RESPIRATION RATE: 16 BRPM | SYSTOLIC BLOOD PRESSURE: 150 MMHG | HEART RATE: 77 BPM

## 2021-05-28 PROCEDURE — 99349 HOME/RES VST EST MOD MDM 40: CPT | Mod: 25

## 2021-05-28 PROCEDURE — G0506: CPT

## 2021-06-01 ENCOUNTER — APPOINTMENT (OUTPATIENT)
Dept: HOME HEALTH SERVICES | Facility: HOME HEALTH | Age: 86
End: 2021-06-01

## 2021-06-01 NOTE — PHYSICAL EXAM
[No Acute Distress] : no acute distress [Normal Sclera/Conjunctiva] : normal sclera/conjunctiva [Normal Outer Ear/Nose] : the ears and nose were normal in appearance [No JVD] : no jugular venous distention [No Respiratory Distress] : no respiratory distress [No Accessory Muscle Use] : no accessory muscle use [No Edema] : there was no peripheral edema [Normal Appearance] : normal in appearance [Normal Bowel Sounds] : normal bowel sounds [Non Tender] : non-tender [Soft] : abdomen soft [Not Distended] : not distended [No HSM] : no hepato-splenomegaly [No Masses] : no abdominal mass palpated [No Hernias] : no hernia was discovered [No Rash] : no rash [No Skin Lesions] : no skin lesions [de-identified] : awake during visit, not able to verbalize; frail w/  temporal wasting [de-identified] : upper and lower extremity muscle wasting  [de-identified] : unable to assess [de-identified] : unable to assess due to advanced dementia

## 2021-06-01 NOTE — HISTORY OF PRESENT ILLNESS
[Family Member] : family member [FreeTextEntry1] : Dementia [FreeTextEntry2] : \par PMH: Dementia, Dysphagia, HTN (off medications), Constipation, Meningioma, Aortic calcification,  S/P CVA with TPA administration (2014). HPI/ROS with HHA\par \par interval events: rec'd the COVID vaccine. \par \par  Patient non-verbal, seen sitting in a chair.   Overall, patient is stable -- daughter notices patient is slowly declining.  She was previously tolerating OOB for 7+ hours, now only OOB 5+ hours. \par Daughter going to wedding next month - and will be taking her mother with her.. she will pack her up and put her in the camper. \par Daughter experiencing some caregiver fatigue.. she has been taking excellent care of her mother for many years and has been taking the toll. \par \par Dementia-  progressive  Is nonverbal, but - she does respond ye or no (if she wants to lay down).  Daughter reports she does have lucid moments.  She is stimulated during the day - house is busy, full of people. \par Appetite:  eating well, pureed food.  Weight has been stable. Daughter will ground up the family dinner, daughter will give her foods that she knows patient likes.  Protein drink everyday.  Thicken liquids -  no coughing.  \par Skin:  no breakdown, no rashes.  Does get OOB daily into kash chair and use of roho cushion.\par BM: prunes daily as well as MiraLAX.  BM everyday.  \par Sleep - Sleeps well at night. Sometimes takes daytime nap. \par Skin:  no breakdown, rashes.  \par \par \par Patient denies fever, cough, trouble breathing, rash, vomiting and diarrhea. Patient has not been in close contact with someone covid positive. \par N95 mask, gloves, eye wear and gown used during visit: Y. Total face to face time with patient is 25 min.\par \par

## 2021-06-01 NOTE — COUNSELING
[Underweight - ( BMI  <18.5 )] : underweight - ( BMI  <18.5 ) [Continue diet as tolerated] : continue diet as tolerated based on goals of care [Non - Smoker] : non-smoker [Remove clutter and unsafe carpeting to avoid falls] : remove clutter and unsafe carpeting to avoid falls [___] : [unfilled] [] : diabetic screening [Minimize unnecessary interventions] : minimize unnecessary interventions [Decrease hospital use] : decrease hospital use [Maintain functional ability] : maintain functional ability [Discussed disease trajectory with patient/caregiver] : discussed disease trajectory with patient/caregiver [Likely to achieve goals/desired outcomes] : likely to achieve goals/desired outcomes [Patient/Caregiver has ___ understanding of disease process] : patient/caregiver has [unfilled] understanding of disease process [DNR] : Code Status: DNR [Limited] : Treatment Guidelines: Limited [DNI] : Intubation: DNI [[facility]: _____] [facility]/last verification date: [unfilled] [_____] : HCP: [unfilled] [ - New patient with 2 or more chronic conditions; CCM discussed and patient-centered care plan established] : New patient with 2 or more chronic conditions; CCM discussed and patient-centered care plan established [FreeTextEntry4] : comfort measures

## 2021-06-01 NOTE — REASON FOR VISIT
[Family Member] : family member [Pre-Visit Preparation] : pre-visit preparation was done [Subsequent Annual Medicare Wellness Visit] : a subsequent annual Medicare wellness visit [Intercurrent Specialty/Sub-specialty Visits] : the patient has no intercurrent specialty/sub-specialty visits [FreeTextEntry1] : dementia  [FreeTextEntry2] : chart review

## 2021-06-01 NOTE — REVIEW OF SYSTEMS
[Constipation] : constipation [Incontinence] : incontinence [Muscle Weakness] : muscle weakness [Confusion] : confusion [Memory Loss] : memory loss [Negative] : Heme/Lymph [Joint Pain] : no joint pain [Joint Stiffness] : no joint stiffness [Joint Swelling] : no joint swelling [Muscle Pain] : no muscle pain [Back Pain] : no back pain [FreeTextEntry7] : incontinence [de-identified] : as noted in HPI [FreeTextEntry1] : Rest of ROS as per HPI or negative.

## 2021-06-01 NOTE — CHRONIC CARE ASSESSMENT
[PPS Score: ____] : Palliative Performance Scale (PPS) Score: [unfilled] [FAST Score: ____] : Functional Assessment Scale (FAST) Score: [unfilled] [Limited decision making ability] : limited decision making ability [Can not Exercise (Disability)] : Exercise: The patient can not exercise due to disability [None] : The patient does not exercise [Inadequate Caloric Intake] : inadequate in calories [FreeTextEntry1] : regular diet; soft foods

## 2021-07-20 NOTE — HEALTH RISK ASSESSMENT
Quality 130: Documentation Of Current Medications In The Medical Record: Current Medications Documented Detail Level: Detailed [HRA Reviewed] : Health risk assessment reviewed [Full assistance needed] : managing finances [No falls in past year] : Patient reported no falls in the past year

## 2021-08-06 ENCOUNTER — NON-APPOINTMENT (OUTPATIENT)
Age: 86
End: 2021-08-06

## 2021-08-09 ENCOUNTER — APPOINTMENT (OUTPATIENT)
Dept: HOME HEALTH SERVICES | Facility: HOME HEALTH | Age: 86
End: 2021-08-09
Payer: MEDICARE

## 2021-08-09 VITALS
SYSTOLIC BLOOD PRESSURE: 118 MMHG | DIASTOLIC BLOOD PRESSURE: 70 MMHG | TEMPERATURE: 96.8 F | OXYGEN SATURATION: 96 % | RESPIRATION RATE: 14 BRPM

## 2021-08-09 DIAGNOSIS — K59.09 OTHER CONSTIPATION: ICD-10-CM

## 2021-08-09 PROCEDURE — 99349 HOME/RES VST EST MOD MDM 40: CPT

## 2021-08-14 ENCOUNTER — NON-APPOINTMENT (OUTPATIENT)
Age: 86
End: 2021-08-14

## 2021-08-14 ENCOUNTER — TRANSCRIPTION ENCOUNTER (OUTPATIENT)
Age: 86
End: 2021-08-14

## 2021-08-21 PROBLEM — K59.09 CHRONIC CONSTIPATION: Status: ACTIVE | Noted: 2017-08-08

## 2021-08-21 NOTE — REVIEW OF SYSTEMS
[Constipation] : constipation [Incontinence] : incontinence [Muscle Weakness] : muscle weakness [Confusion] : confusion [Memory Loss] : memory loss [Negative] : Heme/Lymph [Joint Pain] : no joint pain [Joint Stiffness] : no joint stiffness [Joint Swelling] : no joint swelling [Muscle Pain] : no muscle pain [Back Pain] : no back pain [FreeTextEntry7] : incontinence [de-identified] : as noted in HPI [FreeTextEntry1] : Rest of ROS as per HPI or negative.

## 2021-08-21 NOTE — PHYSICAL EXAM
[No Acute Distress] : no acute distress [Normal Sclera/Conjunctiva] : normal sclera/conjunctiva [Normal Outer Ear/Nose] : the ears and nose were normal in appearance [No JVD] : no jugular venous distention [No Respiratory Distress] : no respiratory distress [No Accessory Muscle Use] : no accessory muscle use [No Edema] : there was no peripheral edema [Normal Appearance] : normal in appearance [Normal Bowel Sounds] : normal bowel sounds [Non Tender] : non-tender [Soft] : abdomen soft [Not Distended] : not distended [No HSM] : no hepato-splenomegaly [No Masses] : no abdominal mass palpated [No Hernias] : no hernia was discovered [No Rash] : no rash [No Skin Lesions] : no skin lesions [de-identified] : awake during visit, not able to verbalize; frail w/  temporal wasting [de-identified] : upper and lower extremity muscle wasting  [de-identified] : unable to assess [de-identified] : unable to assess due to advanced dementia

## 2021-08-21 NOTE — REASON FOR VISIT
[Subsequent Annual Medicare Wellness Visit] : a subsequent annual Medicare wellness visit [Family Member] : family member [Pre-Visit Preparation] : pre-visit preparation was done [Intercurrent Specialty/Sub-specialty Visits] : the patient has no intercurrent specialty/sub-specialty visits [FreeTextEntry1] : dementia  [FreeTextEntry2] : chart review

## 2021-08-21 NOTE — HISTORY OF PRESENT ILLNESS
[Family Member] : family member [FreeTextEntry1] : Dementia [FreeTextEntry2] : \par PMH: Dementia, Dysphagia, HTN (off medications), Constipation, Meningioma, Aortic calcification,  S/P CVA with TPA administration (2014). HPI/ROS with HHA\par \par No significant interval events. \par \par  Patient non-verbal, seen sitting in a chair.   At today's visit, accompanied by her grandson. Patient does get OOB to chair everyday, will sit in recliner chair for about 5hours. \par \par \par Dementia-  progressive  Is nonverbal, but - she does respond ye or no (if she wants to lay down).  Daughter reports she does have lucid moments.  She is stimulated during the day - house is busy, full of people. \par Appetite:  eating well, pureed food.  Weight has been stable. Daughter will ground up the family dinner, daughter will give her foods that she knows patient likes.  Protein drink everyday.  Thicken liquids -  no coughing.  \par Skin:  no breakdown, no rashes.  Does get OOB daily into kash chair and use of roho cushion.\par BM: prunes daily as well as MiraLAX.  BM everyday.  \par Sleep - Sleeps well at night. Sometimes takes daytime nap. \par Skin:  no breakdown, rashes.  \par \par \par Patient denies fever, cough, trouble breathing, rash, vomiting and diarrhea. Patient has not been in close contact with someone covid positive. \par N95 mask, gloves, eye wear and gown used during visit: Y. Total face to face time with patient is 25 min.\par \par

## 2021-08-21 NOTE — COUNSELING
[Underweight - ( BMI  <18.5 )] : underweight - ( BMI  <18.5 ) [Continue diet as tolerated] : continue diet as tolerated based on goals of care [Non - Smoker] : non-smoker [Remove clutter and unsafe carpeting to avoid falls] : remove clutter and unsafe carpeting to avoid falls [___] : [unfilled] [] : diabetic screening [Decrease hospital use] : decrease hospital use [Minimize unnecessary interventions] : minimize unnecessary interventions [Maintain functional ability] : maintain functional ability [Discussed disease trajectory with patient/caregiver] : discussed disease trajectory with patient/caregiver [Likely to achieve goals/desired outcomes] : likely to achieve goals/desired outcomes [Patient/Caregiver has ___ understanding of disease process] : patient/caregiver has [unfilled] understanding of disease process [DNR] : Code Status: DNR [Limited] : Treatment Guidelines: Limited [DNI] : Intubation: DNI [[facility] Date: _____] [facility]/last verification date: [unfilled] [_____] : HCP: [unfilled] [FreeTextEntry4] : comfort measures

## 2021-10-20 ENCOUNTER — NON-APPOINTMENT (OUTPATIENT)
Age: 86
End: 2021-10-20

## 2021-10-21 ENCOUNTER — APPOINTMENT (OUTPATIENT)
Dept: HOME HEALTH SERVICES | Facility: HOME HEALTH | Age: 86
End: 2021-10-21
Payer: MEDICARE

## 2021-10-21 VITALS
RESPIRATION RATE: 14 BRPM | SYSTOLIC BLOOD PRESSURE: 122 MMHG | HEART RATE: 63 BPM | OXYGEN SATURATION: 99 % | TEMPERATURE: 96.7 F | DIASTOLIC BLOOD PRESSURE: 70 MMHG

## 2021-10-21 DIAGNOSIS — L98.9 DISORDER OF THE SKIN AND SUBCUTANEOUS TISSUE, UNSPECIFIED: ICD-10-CM

## 2021-10-21 DIAGNOSIS — D32.9 BENIGN NEOPLASM OF MENINGES, UNSPECIFIED: ICD-10-CM

## 2021-10-21 PROCEDURE — 99349 HOME/RES VST EST MOD MDM 40: CPT | Mod: 25

## 2021-10-21 PROCEDURE — 90662 IIV NO PRSV INCREASED AG IM: CPT

## 2021-10-21 PROCEDURE — G0008: CPT

## 2021-10-21 RX ORDER — MOXIFLOXACIN OPHTHALMIC 5 MG/ML
0.5 SOLUTION/ DROPS OPHTHALMIC 3 TIMES DAILY
Qty: 1 | Refills: 1 | Status: DISCONTINUED | COMMUNITY
Start: 2018-01-22 | End: 2021-10-21

## 2021-10-21 NOTE — COUNSELING
[Underweight - ( BMI  <18.5 )] : underweight - ( BMI  <18.5 ) [Continue diet as tolerated] : continue diet as tolerated based on goals of care [Non - Smoker] : non-smoker [Remove clutter and unsafe carpeting to avoid falls] : remove clutter and unsafe carpeting to avoid falls [___] : [unfilled] [] : diabetic screening [Decrease hospital use] : decrease hospital use [Minimize unnecessary interventions] : minimize unnecessary interventions [Maintain functional ability] : maintain functional ability [Discussed disease trajectory with patient/caregiver] : discussed disease trajectory with patient/caregiver [Likely to achieve goals/desired outcomes] : likely to achieve goals/desired outcomes [Patient/Caregiver has ___ understanding of disease process] : patient/caregiver has [unfilled] understanding of disease process [DNR] : Code Status: DNR [Limited] : Treatment Guidelines: Limited [DNI] : Intubation: DNI [[facility]: _____] [facility]/last verification date: [unfilled] [_____] : HCP: [unfilled] [FreeTextEntry4] : comfort measures

## 2021-10-21 NOTE — HISTORY OF PRESENT ILLNESS
[Family Member] : family member [FreeTextEntry1] : Dementia [FreeTextEntry2] : \par PMH: Dementia, Dysphagia, HTN (off medications), Constipation, Meningioma, Aortic calcification,  S/P CVA with TPA administration (2014). HPI/ROS with patient's daughter. \par \par Interval events: daughter called into the [facility] to report some eye crusting. \par \par  Patient non-verbal, seen sitting in a chair.   At today's visit, accompanied by her daughter. Patient does get OOB to chair everyday, will sit in recliner chair for about 5hours. \par \par Daughter also reports new growth on scalp - she noticed a few months ago - she states it is growing and does bleed easily. \par \par Dementia-  progressive  Is nonverbal, but - she does respond ye or no (if she wants to lay down).  Daughter reports she does have lucid moments.  She is stimulated during the day - house is busy, full of people. Daughter started on brightminds supplement powder ---> making more eye contact, following the tv. \par Appetite:  eating well, pureed food.  Weight has been stable. Daughter will ground up the family dinner, daughter will give her foods that she knows patient likes.  Protein drink everyday.  Thicken liquids -  no coughing.  \par Skin:  no breakdown, no rashes.  Does get OOB daily into kash chair and use of roho cushion.\par BM: prunes daily as well as MiraLAX.  BM everyday.  \par Sleep - Sleeps well at night. Sometimes takes daytime nap. \par Skin:  no breakdown, rashes.  \par \par \par Patient denies fever, cough, trouble breathing, rash, vomiting and diarrhea. Patient has not been in close contact with someone covid positive. \par N95 mask, gloves, eye wear and gown used during visit: Y. Total face to face time with patient is 25 min.\par \par

## 2021-10-21 NOTE — REVIEW OF SYSTEMS
[Constipation] : constipation [Incontinence] : incontinence [Muscle Weakness] : muscle weakness [Confusion] : confusion [Memory Loss] : memory loss [Negative] : Heme/Lymph [Joint Pain] : no joint pain [Joint Stiffness] : no joint stiffness [Joint Swelling] : no joint swelling [Muscle Pain] : no muscle pain [Back Pain] : no back pain [FreeTextEntry7] : incontinence [de-identified] : nodule/growth on scalp [de-identified] : as noted in HPI [FreeTextEntry1] : Rest of ROS as per HPI or negative.

## 2021-10-21 NOTE — PHYSICAL EXAM
[No Acute Distress] : no acute distress [Normal Sclera/Conjunctiva] : normal sclera/conjunctiva [Normal Outer Ear/Nose] : the ears and nose were normal in appearance [No JVD] : no jugular venous distention [No Respiratory Distress] : no respiratory distress [No Accessory Muscle Use] : no accessory muscle use [No Edema] : there was no peripheral edema [Normal Appearance] : normal in appearance [Normal Bowel Sounds] : normal bowel sounds [Non Tender] : non-tender [Soft] : abdomen soft [Not Distended] : not distended [No HSM] : no hepato-splenomegaly [No Masses] : no abdominal mass palpated [No Hernias] : no hernia was discovered [No Rash] : no rash [No Skin Lesions] : no skin lesions [Normal Rate] : heart rate was normal  [Regular Rhythm] : with a regular rhythm [de-identified] : awake during visit, not able to verbalize; frail w/  temporal wasting [de-identified] : upper and lower extremity muscle wasting  [de-identified] : unable to assess [de-identified] : unable to assess due to advanced dementia

## 2021-10-25 ENCOUNTER — NON-APPOINTMENT (OUTPATIENT)
Age: 86
End: 2021-10-25

## 2021-10-27 ENCOUNTER — NON-APPOINTMENT (OUTPATIENT)
Age: 86
End: 2021-10-27

## 2021-12-07 ENCOUNTER — NON-APPOINTMENT (OUTPATIENT)
Age: 86
End: 2021-12-07

## 2021-12-08 ENCOUNTER — APPOINTMENT (OUTPATIENT)
Dept: HOME HEALTH SERVICES | Facility: HOME HEALTH | Age: 86
End: 2021-12-08

## 2021-12-08 VITALS
SYSTOLIC BLOOD PRESSURE: 126 MMHG | TEMPERATURE: 97.4 F | OXYGEN SATURATION: 96 % | HEART RATE: 68 BPM | DIASTOLIC BLOOD PRESSURE: 74 MMHG | RESPIRATION RATE: 16 BRPM

## 2021-12-08 RX ORDER — EPINEPHRINE 0.3 MG/.3ML
0.3 INJECTION INTRAMUSCULAR
Refills: 0 | Status: DISCONTINUED | COMMUNITY
Start: 2021-12-08 | End: 2021-12-08

## 2021-12-08 RX ORDER — CAMPHOR 0.45 %
25 GEL (GRAM) TOPICAL EVERY 8 HOURS
Qty: 1 | Refills: 3 | Status: DISCONTINUED | COMMUNITY
Start: 2021-12-08 | End: 2021-12-08

## 2022-01-01 ENCOUNTER — TRANSCRIPTION ENCOUNTER (OUTPATIENT)
Age: 87
End: 2022-01-01

## 2022-01-01 ENCOUNTER — APPOINTMENT (OUTPATIENT)
Dept: HOME HEALTH SERVICES | Facility: HOME HEALTH | Age: 87
End: 2022-01-01

## 2022-01-01 ENCOUNTER — LABORATORY RESULT (OUTPATIENT)
Age: 87
End: 2022-01-01

## 2022-01-01 ENCOUNTER — APPOINTMENT (OUTPATIENT)
Dept: DERMATOLOGY | Facility: CLINIC | Age: 87
End: 2022-01-01
Payer: MEDICARE

## 2022-01-01 ENCOUNTER — NON-APPOINTMENT (OUTPATIENT)
Age: 87
End: 2022-01-01

## 2022-01-01 ENCOUNTER — APPOINTMENT (OUTPATIENT)
Dept: HOME HEALTH SERVICES | Facility: HOME HEALTH | Age: 87
End: 2022-01-01
Payer: MEDICARE

## 2022-01-01 ENCOUNTER — APPOINTMENT (OUTPATIENT)
Dept: AFTER HOURS CARE | Facility: EMERGENCY ROOM | Age: 87
End: 2022-01-01

## 2022-01-01 VITALS
DIASTOLIC BLOOD PRESSURE: 64 MMHG | TEMPERATURE: 36.3 F | OXYGEN SATURATION: 92 % | HEART RATE: 67 BPM | RESPIRATION RATE: 16 BRPM | SYSTOLIC BLOOD PRESSURE: 122 MMHG

## 2022-01-01 VITALS
DIASTOLIC BLOOD PRESSURE: 80 MMHG | OXYGEN SATURATION: 94 % | TEMPERATURE: 97 F | SYSTOLIC BLOOD PRESSURE: 140 MMHG | RESPIRATION RATE: 16 BRPM | HEART RATE: 68 BPM

## 2022-01-01 VITALS
OXYGEN SATURATION: 97 % | TEMPERATURE: 97.2 F | SYSTOLIC BLOOD PRESSURE: 120 MMHG | RESPIRATION RATE: 16 BRPM | DIASTOLIC BLOOD PRESSURE: 70 MMHG | HEART RATE: 72 BPM

## 2022-01-01 VITALS
RESPIRATION RATE: 14 BRPM | SYSTOLIC BLOOD PRESSURE: 136 MMHG | OXYGEN SATURATION: 97 % | HEART RATE: 66 BPM | DIASTOLIC BLOOD PRESSURE: 72 MMHG

## 2022-01-01 VITALS
TEMPERATURE: 97 F | SYSTOLIC BLOOD PRESSURE: 122 MMHG | OXYGEN SATURATION: 97 % | HEART RATE: 59 BPM | DIASTOLIC BLOOD PRESSURE: 64 MMHG | RESPIRATION RATE: 14 BRPM

## 2022-01-01 VITALS
RESPIRATION RATE: 14 BRPM | TEMPERATURE: 97.5 F | DIASTOLIC BLOOD PRESSURE: 60 MMHG | HEART RATE: 70 BPM | SYSTOLIC BLOOD PRESSURE: 122 MMHG | OXYGEN SATURATION: 94 %

## 2022-01-01 VITALS
TEMPERATURE: 97 F | RESPIRATION RATE: 16 BRPM | DIASTOLIC BLOOD PRESSURE: 62 MMHG | HEART RATE: 60 BPM | SYSTOLIC BLOOD PRESSURE: 100 MMHG | OXYGEN SATURATION: 93 %

## 2022-01-01 VITALS — HEIGHT: 60 IN

## 2022-01-01 DIAGNOSIS — E46 UNSPECIFIED PROTEIN-CALORIE MALNUTRITION: ICD-10-CM

## 2022-01-01 DIAGNOSIS — Z23 ENCOUNTER FOR IMMUNIZATION: ICD-10-CM

## 2022-01-01 DIAGNOSIS — R64 CACHEXIA: ICD-10-CM

## 2022-01-01 DIAGNOSIS — I10 ESSENTIAL (PRIMARY) HYPERTENSION: ICD-10-CM

## 2022-01-01 DIAGNOSIS — Z85.828 PERSONAL HISTORY OF OTHER MALIGNANT NEOPLASM OF SKIN: ICD-10-CM

## 2022-01-01 DIAGNOSIS — D48.5 NEOPLASM OF UNCERTAIN BEHAVIOR OF SKIN: ICD-10-CM

## 2022-01-01 DIAGNOSIS — S05.01XA INJURY OF CONJUNCTIVA AND CORNEAL ABRASION W/OUT FOREIGN BODY, RIGHT EYE, INITIAL ENCOUNTER: ICD-10-CM

## 2022-01-01 DIAGNOSIS — I70.0 ATHEROSCLEROSIS OF AORTA: ICD-10-CM

## 2022-01-01 DIAGNOSIS — F03.90 UNSPECIFIED DEMENTIA W/OUT BEHAVIORAL DISTURBANCE: ICD-10-CM

## 2022-01-01 DIAGNOSIS — Z51.5 ENCOUNTER FOR PALLIATIVE CARE: ICD-10-CM

## 2022-01-01 DIAGNOSIS — H10.9 UNSPECIFIED CONJUNCTIVITIS: ICD-10-CM

## 2022-01-01 DIAGNOSIS — L03.213 PERIORBITAL CELLULITIS: ICD-10-CM

## 2022-01-01 DIAGNOSIS — R13.19 OTHER DYSPHAGIA: ICD-10-CM

## 2022-01-01 DIAGNOSIS — R53.2 FUNCTIONAL QUADRIPLEGIA: ICD-10-CM

## 2022-01-01 PROCEDURE — 99349 HOME/RES VST EST MOD MDM 40: CPT

## 2022-01-01 PROCEDURE — 99203 OFFICE O/P NEW LOW 30 MIN: CPT | Mod: 25

## 2022-01-01 PROCEDURE — 11102 TANGNTL BX SKIN SINGLE LES: CPT

## 2022-01-01 PROCEDURE — 99349 HOME/RES VST EST MOD MDM 40: CPT | Mod: 25

## 2022-01-01 PROCEDURE — G0008: CPT

## 2022-01-01 PROCEDURE — 90662 IIV NO PRSV INCREASED AG IM: CPT

## 2022-01-01 PROCEDURE — 99349 HOME/RES VST EST MOD MDM 40: CPT | Mod: 95

## 2022-01-01 RX ORDER — POLYETHYLENE GLYCOL 3350 17 G/17G
17 POWDER, FOR SOLUTION ORAL DAILY
Qty: 1 | Refills: 5 | Status: ACTIVE | COMMUNITY
Start: 2018-08-12

## 2022-01-01 RX ORDER — MORPHINE SULFATE 10 MG/5ML
10 SOLUTION ORAL
Qty: 30 | Refills: 0 | Status: ACTIVE | COMMUNITY
Start: 2022-01-01 | End: 1900-01-01

## 2022-01-01 RX ORDER — ERYTHROMYCIN 5 MG/G
5 OINTMENT OPHTHALMIC
Qty: 1 | Refills: 1 | Status: ACTIVE | COMMUNITY
Start: 2022-01-01

## 2022-01-01 RX ORDER — CAMPHOR 0.45 %
25 GEL (GRAM) TOPICAL EVERY 8 HOURS
Qty: 1 | Refills: 3 | Status: DISCONTINUED | COMMUNITY
Start: 2022-01-01 | End: 2022-01-01

## 2022-01-01 RX ORDER — EPINEPHRINE 0.15 MG/.15ML
0.15 INJECTION SUBCUTANEOUS
Refills: 0 | Status: DISCONTINUED | COMMUNITY
Start: 2022-01-01 | End: 2022-01-01

## 2022-01-01 RX ORDER — AMOXICILLIN AND CLAVULANATE POTASSIUM 500; 125 MG/1; MG/1
500-125 TABLET, FILM COATED ORAL
Qty: 10 | Refills: 0 | Status: DISCONTINUED | COMMUNITY
Start: 2022-01-01 | End: 2022-01-01

## 2022-01-01 RX ORDER — ERYTHROMYCIN 5 MG/G
5 OINTMENT OPHTHALMIC
Qty: 3 | Refills: 3 | Status: DISCONTINUED | COMMUNITY
Start: 2022-01-01 | End: 2022-01-01

## 2022-01-01 RX ORDER — LORAZEPAM 2 MG/ML
2 CONCENTRATE ORAL
Qty: 1 | Refills: 0 | Status: ACTIVE | COMMUNITY
Start: 2022-01-01 | End: 1900-01-01

## 2022-01-01 RX ORDER — MULTIVITAMIN
TABLET ORAL DAILY
Refills: 0 | Status: ACTIVE | COMMUNITY
Start: 2017-08-08

## 2022-01-14 PROBLEM — R13.19 DYSPHAGIA, NEUROLOGIC: Status: ACTIVE | Noted: 2017-08-08

## 2022-01-14 NOTE — PHYSICAL EXAM
[No Acute Distress] : no acute distress [Normal Sclera/Conjunctiva] : normal sclera/conjunctiva [Normal Outer Ear/Nose] : the ears and nose were normal in appearance [No JVD] : no jugular venous distention [No Respiratory Distress] : no respiratory distress [No Accessory Muscle Use] : no accessory muscle use [Normal Rate] : heart rate was normal  [Regular Rhythm] : with a regular rhythm [No Edema] : there was no peripheral edema [Normal Appearance] : normal in appearance [Normal Bowel Sounds] : normal bowel sounds [Non Tender] : non-tender [Soft] : abdomen soft [Not Distended] : not distended [No HSM] : no hepato-splenomegaly [No Masses] : no abdominal mass palpated [No Hernias] : no hernia was discovered [No Rash] : no rash [No Skin Lesions] : no skin lesions [de-identified] : awake during visit, not able to verbalize; frail w/  temporal wasting [de-identified] : upper and lower extremity muscle wasting  [de-identified] : unable to assess [de-identified] : unable to assess due to advanced dementia

## 2022-01-14 NOTE — COUNSELING
[Underweight - ( BMI  <18.5 )] : underweight - ( BMI  <18.5 ) [Continue diet as tolerated] : continue diet as tolerated based on goals of care [Non - Smoker] : non-smoker [Remove clutter and unsafe carpeting to avoid falls] : remove clutter and unsafe carpeting to avoid falls [___] : [unfilled] [] : diabetic screening [Decrease hospital use] : decrease hospital use [Minimize unnecessary interventions] : minimize unnecessary interventions [Maintain functional ability] : maintain functional ability [Discussed disease trajectory with patient/caregiver] : discussed disease trajectory with patient/caregiver [Likely to achieve goals/desired outcomes] : likely to achieve goals/desired outcomes [Patient/Caregiver has ___ understanding of disease process] : patient/caregiver has [unfilled] understanding of disease process [DNR] : Code Status: DNR [Limited] : Treatment Guidelines: Limited [DNI] : Intubation: DNI [Last [facility] Date: _____] : [facility] Completion/last verification date: [unfilled] [_____] : HCP: [unfilled] [FreeTextEntry4] : comfort measures

## 2022-01-14 NOTE — HISTORY OF PRESENT ILLNESS
[Family Member] : family member [FreeTextEntry1] : Dementia [FreeTextEntry2] : \par PMH: Dementia, Dysphagia, HTN (off medications), Constipation, Meningioma, Aortic calcification,  S/P CVA with TPA administration (2014). HPI/ROS with patient's daughter. \par \par Interval events: rec'd COVID booster. \par \par  Patient non-verbal, seen laying in bed   At today's visit, accompanied by her daughter. Patient does get OOB to chair everyday, will sit in recliner chair for about 5hours. \par \par Daughter also reports growth on scalp - feels it is getting better - will send referral to derm. \par \par Dementia-  progressive  Is nonverbal, but - she does respond ye or no (if she wants to lay down).  Daughter reports she does have lucid moments.  She is stimulated during the day - house is busy, full of people. Daughter started on brightminds supplement powder ---> making more eye contact, following the tv. \par Appetite:  eating well, pureed food.  Weight has been stable. Daughter will ground up the family dinner, daughter will give her foods that she knows patient likes.  Protein drink everyday.  Thicken liquids -  no coughing.  \par Skin:  no breakdown, no rashes.  Does get OOB daily into kash chair and use of roho cushion.\par BM: prunes daily as well as MiraLAX.  BM everyday.  \par Sleep - Sleeps well at night. Sometimes takes daytime nap. \par Skin:  no breakdown, rashes.  \par \par \par Patient denies fever, cough, trouble breathing, rash, vomiting and diarrhea. Patient has not been in close contact with someone covid positive. \par N95 mask, gloves, eye wear and gown used during visit: Y. Total face to face time with patient is 25 min.\par \par

## 2022-01-14 NOTE — REVIEW OF SYSTEMS
[Constipation] : constipation [Incontinence] : incontinence [Muscle Weakness] : muscle weakness [Confusion] : confusion [Memory Loss] : memory loss [Negative] : Heme/Lymph [Joint Pain] : no joint pain [Joint Stiffness] : no joint stiffness [Joint Swelling] : no joint swelling [Muscle Pain] : no muscle pain [Back Pain] : no back pain [FreeTextEntry7] : incontinence [de-identified] : nodule/growth on scalp [de-identified] : as noted in HPI [FreeTextEntry1] : Rest of ROS as per HPI or negative.

## 2022-01-20 PROBLEM — D48.5 NEOPLASM OF UNCERTAIN BEHAVIOR OF SKIN: Status: ACTIVE | Noted: 2022-01-01

## 2022-01-20 PROBLEM — Z85.828 HISTORY OF SKIN CANCER: Status: ACTIVE | Noted: 2022-01-01

## 2022-03-18 PROBLEM — L03.213 PRESEPTAL CELLULITIS OF RIGHT EYE: Status: ACTIVE | Noted: 2020-02-21

## 2022-03-18 PROBLEM — S05.01XA ABRASION OF CORNEA, RIGHT: Status: ACTIVE | Noted: 2022-01-01

## 2022-04-05 PROBLEM — I70.0 AORTIC CALCIFICATION: Status: ACTIVE | Noted: 2017-11-13

## 2022-04-05 NOTE — COUNSELING
[Underweight - ( BMI  <18.5 )] : underweight - ( BMI  <18.5 ) [Continue diet as tolerated] : continue diet as tolerated based on goals of care [Non - Smoker] : non-smoker [Remove clutter and unsafe carpeting to avoid falls] : remove clutter and unsafe carpeting to avoid falls [___] : [unfilled] [] : diabetic screening [Decrease hospital use] : decrease hospital use [Minimize unnecessary interventions] : minimize unnecessary interventions [Maintain functional ability] : maintain functional ability [Discussed disease trajectory with patient/caregiver] : discussed disease trajectory with patient/caregiver [Likely to achieve goals/desired outcomes] : likely to achieve goals/desired outcomes [Patient/Caregiver has ___ understanding of disease process] : patient/caregiver has [unfilled] understanding of disease process [DNR] : Code Status: DNR [Limited] : Treatment Guidelines: Limited [DNI] : Intubation: DNI [[facility]: _____[facility]/last verification date: [unfilled] [_____] : HCP: [unfilled] [FreeTextEntry4] : comfort measures

## 2022-04-05 NOTE — PHYSICAL EXAM
[No Acute Distress] : no acute distress [Normal Sclera/Conjunctiva] : normal sclera/conjunctiva [Normal Outer Ear/Nose] : the ears and nose were normal in appearance [No JVD] : no jugular venous distention [No Respiratory Distress] : no respiratory distress [No Accessory Muscle Use] : no accessory muscle use [Normal Rate] : heart rate was normal  [Regular Rhythm] : with a regular rhythm [No Edema] : there was no peripheral edema [Normal Appearance] : normal in appearance [Normal Bowel Sounds] : normal bowel sounds [Non Tender] : non-tender [Soft] : abdomen soft [Not Distended] : not distended [No HSM] : no hepato-splenomegaly [No Masses] : no abdominal mass palpated [No Hernias] : no hernia was discovered [No Rash] : no rash [No Skin Lesions] : no skin lesions [de-identified] : awake during visit, not able to verbalize; frail w/  temporal wasting [de-identified] : upper and lower extremity muscle wasting  [de-identified] : unable to assess [de-identified] : unable to assess due to advanced dementia

## 2022-04-05 NOTE — HISTORY OF PRESENT ILLNESS
[Family Member] : family member [FreeTextEntry1] : Dementia [FreeTextEntry2] : \par PMH: Dementia, Dysphagia, HTN (off medications), Constipation, Meningioma, Aortic calcification,  S/P CVA with TPA administration (2014). HPI/ROS with patient's daughter. \par \par Interval events: saw derm for growth on scalp. Also, was given abx for preseptal cellulitis & corneal abrasion. \par \par  Patient non-verbal, sitting in kash-chair/recliner chair.  Patient does get OOB to chair everyday, will sit in recliner chair for about 5hours. As per daughter, patient does seem more alert/engaged recently. \par \par \par Dementia-  progressive  Is nonverbal, but - she does respond yes or no (if she wants to lay down).  Daughter reports she does have lucid moments.  She is stimulated during the day - house is busy, full of people. Daughter will give  brightminds supplement powder every couple of months. She also started giving her NeuroLink daily ---> making more eye contact, following the tv. \par Appetite:  eating well, pureed food.  Weight has been stable. Daughter will ground up the family dinner, daughter will give her foods that she knows patient likes.  Protein drink everyday.  Thicken liquids -  no coughing.  Patient is fed very well, daughter feeds her healthy meals. \par Skin:  no breakdown, no rashes.  Does get OOB daily into kash chair and use of roho cushion.\par BM: prunes daily as well as MiraLAX.  BM everyday.  \par Sleep - Sleeps well at night. Sometimes takes daytime nap. \par Skin:  no breakdown, rashes.  \par \par \par Patient denies fever, cough, trouble breathing, rash, vomiting and diarrhea. Patient has not been in close contact with someone covid positive. \par N95 mask, gloves, eye wear and gown used during visit: Y. Total face to face time with patient is 25 min.\par \par

## 2022-04-05 NOTE — REVIEW OF SYSTEMS
[Constipation] : constipation [Incontinence] : incontinence [Muscle Weakness] : muscle weakness [Confusion] : confusion [Memory Loss] : memory loss [Negative] : Heme/Lymph [Joint Pain] : no joint pain [Joint Stiffness] : no joint stiffness [Joint Swelling] : no joint swelling [Muscle Pain] : no muscle pain [Back Pain] : no back pain [FreeTextEntry7] : incontinence [FreeTextEntry9] : non-ambulatory  [de-identified] : as noted in HPI [FreeTextEntry1] : Rest of ROS as per HPI or negative.

## 2022-07-27 PROBLEM — I10 BENIGN ESSENTIAL HTN: Status: ACTIVE | Noted: 2017-08-08

## 2022-07-27 NOTE — HISTORY OF PRESENT ILLNESS
[Family Member] : family member [FreeTextEntry1] : Dementia [FreeTextEntry2] : \par PMH: Dementia, Dysphagia, HTN (off medications), Constipation, Meningioma, Aortic calcification,  S/P CVA with TPA administration (2014). HPI/ROS with patient's daughter. \par \par Interval events: daughter called to report some congestion. \par \par  Patient non-verbal, sitting in kash-chair/recliner chair.  Patient does get OOB to chair everyday, will sit in recliner chair for about 5hours. As per daughter, patient does seem more alert/engaged recently. No new changes - daughter does not notice decline. \par \par \par Dementia-  progressive  Is nonverbal, but - she does respond yes or no (if she wants to lay down).  Daughter reports she does have lucid moments.  She is stimulated during the day - house is busy, full of people. Daughter will give  brightminds supplement powder every couple of months. She also started giving her NeuroLink daily ---> making more eye contact, following the tv. \par Appetite:  eating well, pureed food.  Weight has been stable. Daughter will ground up the family dinner, daughter will give her foods that she knows patient likes.  Protein drink everyday.  Thicken liquids -  no coughing.  Patient is fed very well, daughter feeds her healthy meals. \par Skin:  no breakdown, no rashes.  Does get OOB daily into kash chair and use of roho cushion.\par BM: prunes daily as well as MiraLAX.  BM everyday.  \par Sleep - Sleeps well at night. Sometimes takes daytime nap. \par Skin:  no breakdown, rashes.  \par \par \par Patient denies fever, cough, trouble breathing, rash, vomiting and diarrhea. Patient has not been in close contact with someone covid positive. \par N95 mask, gloves, eye wear and gown used during visit: Y. Total face to face time with patient is 25 min.\par \par

## 2022-07-27 NOTE — COUNSELING
[Underweight - ( BMI  <18.5 )] : underweight - ( BMI  <18.5 ) [Continue diet as tolerated] : continue diet as tolerated based on goals of care [Non - Smoker] : non-smoker [Remove clutter and unsafe carpeting to avoid falls] : remove clutter and unsafe carpeting to avoid falls [___] : [unfilled] [] : diabetic screening [Decrease hospital use] : decrease hospital use [Minimize unnecessary interventions] : minimize unnecessary interventions [Discussed disease trajectory with patient/caregiver] : discussed disease trajectory with patient/caregiver [Maintain functional ability] : maintain functional ability [Likely to achieve goals/desired outcomes] : likely to achieve goals/desired outcomes [Patient/Caregiver has ___ understanding of disease process] : patient/caregiver has [unfilled] understanding of disease process [DNR] : Code Status: DNR [Limited] : Treatment Guidelines: Limited [DNI] : Intubation: DNI [[facility] Date: _____] [facility]/last verification date: [unfilled] [_____] : HCP: [unfilled] [FreeTextEntry4] : comfort measures

## 2022-07-27 NOTE — REVIEW OF SYSTEMS
[Constipation] : constipation [Incontinence] : incontinence [Muscle Weakness] : muscle weakness [Confusion] : confusion [Memory Loss] : memory loss [Negative] : Heme/Lymph [Joint Pain] : no joint pain [Joint Stiffness] : no joint stiffness [Joint Swelling] : no joint swelling [Muscle Pain] : no muscle pain [Back Pain] : no back pain [FreeTextEntry7] : incontinence [FreeTextEntry9] : non-ambulatory  [de-identified] : as noted in HPI [FreeTextEntry1] : Rest of ROS as per HPI or negative.

## 2022-07-27 NOTE — PHYSICAL EXAM
[No Acute Distress] : no acute distress [Normal Sclera/Conjunctiva] : normal sclera/conjunctiva [Normal Outer Ear/Nose] : the ears and nose were normal in appearance [No JVD] : no jugular venous distention [No Respiratory Distress] : no respiratory distress [No Accessory Muscle Use] : no accessory muscle use [Regular Rhythm] : with a regular rhythm [Normal Rate] : heart rate was normal  [No Edema] : there was no peripheral edema [Normal Appearance] : normal in appearance [Normal Bowel Sounds] : normal bowel sounds [Non Tender] : non-tender [Soft] : abdomen soft [Not Distended] : not distended [No HSM] : no hepato-splenomegaly [No Masses] : no abdominal mass palpated [No Hernias] : no hernia was discovered [No Rash] : no rash [No Skin Lesions] : no skin lesions [de-identified] : awake during visit, not able to verbalize; frail w/  temporal wasting [de-identified] : upper and lower extremity muscle wasting  [de-identified] : unable to assess [de-identified] : unable to assess due to advanced dementia

## 2022-09-04 PROBLEM — H10.9 CONJUNCTIVITIS: Status: RESOLVED | Noted: 2022-01-01 | Resolved: 2022-01-01

## 2022-10-20 PROBLEM — Z23 FLU VACCINE NEED: Status: ACTIVE | Noted: 2020-10-19

## 2022-10-20 PROBLEM — E46 PROTEIN-CALORIE MALNUTRITION: Status: ACTIVE | Noted: 2019-08-27

## 2022-10-20 PROBLEM — R53.2 FUNCTIONAL QUADRIPLEGIA: Status: ACTIVE | Noted: 2019-08-27

## 2022-10-20 NOTE — HISTORY OF PRESENT ILLNESS
[Family Member] : family member [FreeTextEntry2] : \par PMH: Dementia, Dysphagia, HTN (off medications), Constipation, Meningioma, Aortic calcification,  S/P CVA with TPA administration (2014). HPI/ROS with patient's daughter. \par \par Interval events: routine visit with care manager. Daughter also called to report eye redness/discharge. \par \par  Patient non-verbal, sitting in kash-chair/recliner chair.  Patient does get OOB to chair everyday, will sit in recliner chair for about 5hours. As per daughter, patient does seem more alert/engaged recently. \par Daughter selling house she lives in with patient - they are buying a camper and will be traveling. The address they are providing is in [place], but will prefer to meet in [facility]. \par \par \par Dementia-  progressive  Is nonverbal, but - she does respond yes or no (if she wants to lay down).  Daughter reports she does have lucid moments.  She is stimulated during the day - house is busy, full of people. Daughter will give  brightminds supplement powder every couple of months. She also started giving her NeuroLink daily ---> making more eye contact, following the tv. \par Appetite:  eating well, pureed food.  Weight has been stable. Daughter will ground up the family dinner, daughter will give her foods that she knows patient likes.  Protein drink everyday.  Thicken liquids -  no coughing.  Patient is fed very well, daughter feeds her healthy meals. \par Skin:  no breakdown, no rashes.  Does get OOB daily into kash chair and use of roho cushion.\par BM: prunes daily as well as MiraLAX.  BM everyday.  \par Sleep - Sleeps well at night. Sometimes takes daytime nap. \par Skin:  no breakdown, rashes.  \par \par \par Patient denies fever, cough, trouble breathing, rash, vomiting and diarrhea. Patient has not been in close contact with someone covid positive. \par N95 mask, gloves, eye wear and gown used during visit: Y. Total face to face time with patient is 25 min.\par \par  [FreeTextEntry1] : Dementia

## 2022-10-20 NOTE — COUNSELING
[Underweight - ( BMI  <18.5 )] : underweight - ( BMI  <18.5 ) [Continue diet as tolerated] : continue diet as tolerated based on goals of care [Non - Smoker] : non-smoker [Remove clutter and unsafe carpeting to avoid falls] : remove clutter and unsafe carpeting to avoid falls [___] : [unfilled] [] : diabetic screening [Decrease hospital use] : decrease hospital use [Minimize unnecessary interventions] : minimize unnecessary interventions [Maintain functional ability] : maintain functional ability [Discussed disease trajectory with patient/caregiver] : discussed disease trajectory with patient/caregiver [Likely to achieve goals/desired outcomes] : likely to achieve goals/desired outcomes [Patient/Caregiver has ___ understanding of disease process] : patient/caregiver has [unfilled] understanding of disease process [DNR] : Code Status: DNR [Limited] : Treatment Guidelines: Limited [DNI] : Intubation: DNI [[facility] Date: _____] [facility]/last verification date: [unfilled] [_____] : HCP: [unfilled] [FreeTextEntry4] : comfort measures

## 2022-10-20 NOTE — REVIEW OF SYSTEMS
[Constipation] : constipation [Incontinence] : incontinence [Muscle Weakness] : muscle weakness [Confusion] : confusion [Memory Loss] : memory loss [Negative] : Heme/Lymph [Joint Pain] : no joint pain [Joint Stiffness] : no joint stiffness [Joint Swelling] : no joint swelling [Muscle Pain] : no muscle pain [Back Pain] : no back pain [FreeTextEntry2] : thin, [FreeTextEntry7] : incontinence [FreeTextEntry9] : non-ambulatory  [de-identified] : as noted in HPI [FreeTextEntry1] : Rest of ROS as per HPI or negative.

## 2022-10-20 NOTE — PHYSICAL EXAM
[No Acute Distress] : no acute distress [Normal Sclera/Conjunctiva] : normal sclera/conjunctiva [Normal Outer Ear/Nose] : the ears and nose were normal in appearance [No JVD] : no jugular venous distention [No Respiratory Distress] : no respiratory distress [No Accessory Muscle Use] : no accessory muscle use [Normal Rate] : heart rate was normal  [Regular Rhythm] : with a regular rhythm [No Edema] : there was no peripheral edema [Normal Appearance] : normal in appearance [Normal Bowel Sounds] : normal bowel sounds [Non Tender] : non-tender [Soft] : abdomen soft [Not Distended] : not distended [No HSM] : no hepato-splenomegaly [No Masses] : no abdominal mass palpated [No Hernias] : no hernia was discovered [No Rash] : no rash [No Skin Lesions] : no skin lesions [de-identified] : awake during visit, not able to verbalize; frail w/  temporal wasting [de-identified] : upper and lower extremity muscle wasting  [de-identified] : unable to assess [de-identified] : unable to assess due to advanced dementia

## 2022-12-22 PROBLEM — Z51.5 END OF LIFE CARE: Status: ACTIVE | Noted: 2022-01-01

## 2022-12-22 PROBLEM — F03.90 DEMENTIA, SENILE, WITHOUT BEHAVIORAL DISTURBANCE: Status: ACTIVE | Noted: 2017-08-08

## 2022-12-22 PROBLEM — R64 CACHEXIA: Status: ACTIVE | Noted: 2022-01-01

## 2024-10-02 NOTE — ED PROVIDER NOTE - MEDICAL DECISION MAKING DETAILS
Left message to call back for: Patient  Information to relay to patient: See provider message below. Result letter sent 3/2024, please review with patient and help schedule diabetes follow-up office visit.   IVF labs cxr ekg enzymes, cultures, lactate u/a ct head zosyn, warming blanket, admit to telemetry Dr Liu
